# Patient Record
Sex: FEMALE | Race: WHITE | NOT HISPANIC OR LATINO | Employment: OTHER | URBAN - METROPOLITAN AREA
[De-identification: names, ages, dates, MRNs, and addresses within clinical notes are randomized per-mention and may not be internally consistent; named-entity substitution may affect disease eponyms.]

---

## 2017-02-11 DIAGNOSIS — E03.9 ACQUIRED HYPOTHYROIDISM: Primary | ICD-10-CM

## 2017-02-11 NOTE — TELEPHONE ENCOUNTER
HIRA THYROID 60 MG tablet     Last Written Prescription Date: 7/28/16  Last Quantity: 90, # refills: 1  Last Office Visit with G, P or [facility] prescribing provider: 9/19/16        TSH   Date Value Ref Range Status   09/19/2016 3.03 0.40 - 4.00 mU/L Final

## 2017-02-14 RX ORDER — THYROID 60 MG
TABLET ORAL
Qty: 90 TABLET | Refills: 0 | Status: SHIPPED | OUTPATIENT
Start: 2017-02-14 | End: 2017-05-12

## 2017-02-14 NOTE — TELEPHONE ENCOUNTER
Prescription approved per [facility] Refill Protocol.  Echo Lopez RN

## 2017-03-13 DIAGNOSIS — I10 HYPERTENSION GOAL BP (BLOOD PRESSURE) < 150/90: ICD-10-CM

## 2017-03-13 NOTE — TELEPHONE ENCOUNTER
potassium chloride       Last Written Prescription Date: 03/17/16  Last Fill Quantity: 90, # refills: 3    Last Office Visit with [facility], [facility] or [facility] prescribing provider:  09/19/16 Dr. Ordaz     Future Office Visit:        BP Readings from Last 3 Encounters:   09/19/16 149/72   03/17/16 175/76   09/17/15 139/72     JAMIR Mckenzie (TANJA)

## 2017-03-15 RX ORDER — PROPRANOLOL HYDROCHLORIDE 80 MG/1
80 TABLET ORAL 2 TIMES DAILY
Qty: 180 TABLET | Refills: 3 | Status: SHIPPED | OUTPATIENT
Start: 2017-03-15 | End: 2018-04-20

## 2017-03-15 RX ORDER — POTASSIUM CHLORIDE 1500 MG/1
TABLET, EXTENDED RELEASE ORAL
Qty: 90 TABLET | Refills: 3 | Status: SHIPPED | OUTPATIENT
Start: 2017-03-15 | End: 2018-03-06

## 2017-03-15 RX ORDER — HYDROCHLOROTHIAZIDE 25 MG/1
25 TABLET ORAL DAILY
Qty: 90 TABLET | Refills: 1 | Status: SHIPPED | OUTPATIENT
Start: 2017-03-15 | End: 2017-12-08

## 2017-03-15 RX ORDER — AMLODIPINE BESYLATE 5 MG/1
5 TABLET ORAL DAILY
Qty: 90 TABLET | Refills: 3 | Status: SHIPPED | OUTPATIENT
Start: 2017-03-15 | End: 2018-04-25

## 2017-03-15 NOTE — TELEPHONE ENCOUNTER
Routing refill request to provider for review/approval because:  Labs out of range:  Elevated blood pressure readings.  Echo Lopez RN

## 2017-04-17 ENCOUNTER — OFFICE VISIT (OUTPATIENT)
Dept: FAMILY MEDICINE | Facility: CLINIC | Age: 82
End: 2017-04-17
Payer: MEDICARE

## 2017-04-17 VITALS
WEIGHT: 144.6 LBS | TEMPERATURE: 98.1 F | OXYGEN SATURATION: 94 % | HEIGHT: 58 IN | BODY MASS INDEX: 30.35 KG/M2 | HEART RATE: 74 BPM | DIASTOLIC BLOOD PRESSURE: 80 MMHG | SYSTOLIC BLOOD PRESSURE: 170 MMHG

## 2017-04-17 DIAGNOSIS — I10 BENIGN ESSENTIAL HYPERTENSION: Primary | ICD-10-CM

## 2017-04-17 DIAGNOSIS — R07.89 CHEST TIGHTNESS: ICD-10-CM

## 2017-04-17 DIAGNOSIS — E03.9 ACQUIRED HYPOTHYROIDISM: ICD-10-CM

## 2017-04-17 DIAGNOSIS — F03.90 DEMENTIA WITHOUT BEHAVIORAL DISTURBANCE, UNSPECIFIED DEMENTIA TYPE: ICD-10-CM

## 2017-04-17 DIAGNOSIS — R42 DIZZINESS: ICD-10-CM

## 2017-04-17 LAB
ANION GAP SERPL CALCULATED.3IONS-SCNC: 6 MMOL/L (ref 3–14)
BUN SERPL-MCNC: 18 MG/DL (ref 7–30)
CALCIUM SERPL-MCNC: 9.4 MG/DL (ref 8.5–10.1)
CHLORIDE SERPL-SCNC: 97 MMOL/L (ref 94–109)
CO2 SERPL-SCNC: 33 MMOL/L (ref 20–32)
CREAT SERPL-MCNC: 0.65 MG/DL (ref 0.52–1.04)
GFR SERPL CREATININE-BSD FRML MDRD: 86 ML/MIN/1.7M2
GLUCOSE SERPL-MCNC: 96 MG/DL (ref 70–99)
POTASSIUM SERPL-SCNC: 3.6 MMOL/L (ref 3.4–5.3)
SODIUM SERPL-SCNC: 136 MMOL/L (ref 133–144)
T3 SERPL-MCNC: 95 NG/DL (ref 60–181)
TSH SERPL DL<=0.005 MIU/L-ACNC: 2.94 MU/L (ref 0.4–4)
VIT B12 SERPL-MCNC: 851 PG/ML (ref 193–986)

## 2017-04-17 PROCEDURE — 99214 OFFICE O/P EST MOD 30 MIN: CPT | Performed by: FAMILY MEDICINE

## 2017-04-17 PROCEDURE — 82607 VITAMIN B-12: CPT | Performed by: FAMILY MEDICINE

## 2017-04-17 PROCEDURE — 36415 COLL VENOUS BLD VENIPUNCTURE: CPT | Performed by: FAMILY MEDICINE

## 2017-04-17 PROCEDURE — 84480 ASSAY TRIIODOTHYRONINE (T3): CPT | Performed by: FAMILY MEDICINE

## 2017-04-17 PROCEDURE — 84443 ASSAY THYROID STIM HORMONE: CPT | Performed by: FAMILY MEDICINE

## 2017-04-17 PROCEDURE — 80048 BASIC METABOLIC PNL TOTAL CA: CPT | Performed by: FAMILY MEDICINE

## 2017-04-17 ASSESSMENT — PAIN SCALES - GENERAL: PAINLEVEL: NO PAIN (0)

## 2017-04-17 NOTE — MR AVS SNAPSHOT
"              After Visit Summary   4/17/2017    Ania Cortez    MRN: [id number]           Patient Information     Date Of Birth          12/25/1926        Visit Information        Provider Department      4/17/2017 2:00 PM Juliette Ordaz MD [facility]        Today's Diagnoses     Benign essential hypertension    -  1    Acquired hypothyroidism        Dementia without behavioral disturbance, unspecified dementia type        Dizziness        Chest tightness          Care Instructions    If chest tightness persists, go the E.R. However, if it is only occurring intermittently then follow up for further work up.     Follow up with ongoing dizziness or chest concerns.     Follow up in 6 months for check in.         Follow-ups after your visit        Who to contact     If you have questions or need follow up information about today's clinic visit or your schedule please contact [facility] directly at [phone].  Normal or non-critical lab and imaging results will be communicated to you by [employer], letter or phone within 4 business days after the clinic has received the results. If you do not hear from us within 7 days, please contact the clinic through [employer] or phone. If you have a critical or abnormal lab result, we will notify you by phone as soon as possible.  Submit refill requests through [employer] or call your pharmacy and they will forward the refill request to us. Please allow 3 business days for your refill to be completed.          Additional Information About Your Visit        MyChart Information     [employer] lets you send messages to your doctor, view your test results, renew your prescriptions, schedule appointments and more. To sign up, go to [email or web address] . Click on \"Log in\" on the left side of the screen, which will take you to the Welcome page. Then click on \"Sign up Now\" on the right side of the page.     You will be asked to enter the access code " "listed below, as well as some personal information. Please follow the directions to create your username and password.     Your access code is: [email or web address]  Expires: 2017  2:51 PM     Your access code will  in 90 days. If you need help or a new code, please call your [facility] or [phone].        Care EveryWhere ID     This is your Care EveryWhere ID. This could be used by other organizations to access your [facility] medical records  [id number]        Your Vitals Were     Pulse Temperature Height Pulse Oximetry Breastfeeding? BMI (Body Mass Index)    74 98.1  F (36.7  C) (Oral) 1.473 m (4' 10\") 94% No 30.22 kg/m2       Blood Pressure from Last 3 Encounters:   17 166/82   16 149/72   16 175/76    Weight from Last 3 Encounters:   17 65.6 kg (144 lb 9.6 oz)   16 65.5 kg (144 lb 8 oz)   16 62.1 kg (137 lb)              We Performed the Following     Basic metabolic panel     T3, total     TSH with free T4 reflex     Vitamin B12        Primary Care Provider Office Phone # Fax #    Juliette Ordaz [phone]       [facility] [address] [place] [address] N  [facility] [address]        Thank you!     Thank you for choosing [facility]  for your care. Our goal is always to provide you with excellent care. Hearing back from our patients is one way we can continue to improve our services. Please take a few minutes to complete the written survey that you may receive in the mail after your visit with us. Thank you!             Your Updated Medication List - Protect others around you: Learn how to safely use, store and throw away your medicines at www.disposemymeds.org.          This list is accurate as of: 17  2:51 PM.  Always use your most recent med list.                   Brand Name Dispense Instructions for use    acetaminophen 325 MG tablet    TYLENOL    100 tablet    Take 2 tablets (650 mg) by mouth 3 times daily And " Every 4 hours prn       amLODIPine 5 MG tablet    NORVASC    90 tablet    Take 1 tablet (5 mg) by mouth daily       ARMOUR THYROID 60 MG tablet   Generic drug:  thyroid     90 tablet    TAKE 1 TABLET BY MOUTH DAILY       aspirin  MG EC tablet      Take 325 mg by mouth daily.       hydrochlorothiazide 25 MG tablet    HYDRODIURIL    90 tablet    Take 1 tablet (25 mg) by mouth daily       ibuprofen 200 MG tablet    ADVIL    100 tablet    Take 3 tablets (600 mg) by mouth 3 times daily (with meals) scheduled for 5 days and then every 6 hours as needed for pain.       multivitamin, therapeutic with minerals Tabs tablet      Take 1 tablet by mouth daily.       * order for DME     4 Device    Equipment being ordered: Knee high compression stockings 20-30 mmHg, measure to size       * order for DME     1 Device    Equipment being ordered: wheeled walker       potassium chloride SA 20 MEQ CR tablet    K-DUR/KLOR-CON M    90 tablet    TAKE 1 TABLET(20 MEQ) BY MOUTH DAILY       propranolol 80 MG tablet    INDERAL    180 tablet    Take 1 tablet (80 mg) by mouth 2 times daily       * Notice:  This list has 2 medication(s) that are the same as other medications prescribed for you. Read the directions carefully, and ask your doctor or other care provider to review them with you.

## 2017-04-17 NOTE — NURSING NOTE
"Chief Complaint   Patient presents with     Hypertension       Initial /78 (BP Location: Left arm, Patient Position: Chair, Cuff Size: Adult Small)  Pulse 74  Temp 98.1  F (36.7  C) (Oral)  Ht 1.473 m (4' 10\")  Wt 65.6 kg (144 lb 9.6 oz)  SpO2 94%  Breastfeeding? No  BMI 30.22 kg/m2 Estimated body mass index is 30.22 kg/(m^2) as calculated from the following:    Height as of this encounter: 1.473 m (4' 10\").    Weight as of this encounter: 65.6 kg (144 lb 9.6 oz).  Medication Reconciliation: complete     ANDIE Robertson MA      "

## 2017-04-17 NOTE — PATIENT INSTRUCTIONS
If chest tightness persists, go the E.R. However, if it is only occurring intermittently then follow up for further work up.     Follow up with ongoing dizziness or chest concerns.     Follow up in 6 months for check in.

## 2017-04-17 NOTE — LETTER
[address]  [address]  [phone]    April 18, 2017      Ania Cortez  [address]   [facility] [address]              Dear Ania,    It was a pleasure seeing you at your recent visit. Your labs have been reviewed and are attached.     Labs look good with no cause for your dizziness seen. The kidney and electrolyte panel, thyroid tests and vitamin B12 level were all normal.     We could trial a small increase in your blood pressure medications to see if this gives you a bit better blood pressure control which may in turn help reduce the risk of dizziness. Please trial increasing your amlodipine dose to 7.5 mg daily (Take 1.5 tablets of the 5 mg dose).  Let me know how this is working after a few weeks, and I'll send in a new prescription if it is going well.         Sincerely,    Juliette Ordaz M.D.      Results for orders placed or performed in visit on 04/17/17   Basic metabolic panel   Result Value Ref Range    Sodium 136 133 - 144 mmol/L    Potassium 3.6 3.4 - 5.3 mmol/L    Chloride 97 94 - 109 mmol/L    Carbon Dioxide 33 (H) 20 - 32 mmol/L    Anion Gap 6 3 - 14 mmol/L    Glucose 96 70 - 99 mg/dL    Urea Nitrogen 18 7 - 30 mg/dL    Creatinine 0.65 0.52 - 1.04 mg/dL    GFR Estimate 86 >60 mL/min/1.7m2    GFR Estimate If Black >90   GFR Calc   >60 mL/min/1.7m2    Calcium 9.4 8.5 - 10.1 mg/dL   Vitamin B12   Result Value Ref Range    Vitamin B12 851 193 - 986 pg/mL   TSH with free T4 reflex   Result Value Ref Range    TSH 2.94 0.40 - 4.00 mU/L   T3, total   Result Value Ref Range    Triiodothyronine (T3) 95 60 - 181 ng/dL

## 2017-04-17 NOTE — PROGRESS NOTES
SUBJECTIVE:                                                    Ania Cortez is a 89 year old female who presents to clinic today for the following health issues:      Hypertension Follow-up      Outpatient blood pressures checking randomly    Low Salt Diet: not monitoring salt       Amount of exercise or physical activity: None    Problems taking medications regularly: No    Medication side effects: none    Diet: regular (no restrictions)      Problem list and histories reviewed & adjusted, as indicated.  Additional history: as documented    Ania reports that yesterday morning when she got out of bed, she was very dizzy and is unsure why. She states that her balance was not quite normal, but this improved throughout the day. She slept throughout the night, 8 hours which is abnormal for her as she usually wakes up throughout the night. Some residual dizziness this morning but this has resolved now.  Unsure if turning her headed caused dizziness. Denies nausea, vision changes, weakness in arms, legs.     HTN: she has been intermittently checking her bp at home. At home systolic:137-156. Denies trouble with medication and she is taking her medication. Daughter in law reports she is arranging her own medication and her system seems to be going well.     Additional Notes  - she states that her bottom dentures do not fit and she has f/u for readjustment.   -appetite is stable, she is eating 3 meals a day most of the time.   --arthritis is improved and she attributes this to not overdoing activities. Had right shoulder surgery and has limited activities which she believes has helped.   -daughter in law notes some mild memory degeneratio but nothing outside normal and  it notes that it is not concerning.  States she does not talk to many people, and is eating meals in her appointment. Playing bingo in her building.   -has had some mild chest pressure when sitting last night. Lasted 5-10 minutes and then resolved on it  won. Denies trouble breathing, nausea, worsening dizziness. Declines further work up and states she is not concerned as it wasn't that bad but will f/u if persistent.   -allergies are flared, and has had some rhinorrhea and congestion. Denies sinus pain.states she is allergic to something in her apartment, rhinorrhea and congestion have worsened with moving to her assisted living 2 years ago.  States dizziness did not worsen sx's.   - she used to take thyroid medication but as of now she states she is feeling lazy. She is also forgetting to take Aspirin sometimes.   -she is taking OTC multivitamin from [employer]  And states it it has [id number].      Patient Active Problem List   Diagnosis     S/P rotator cuff surgery     Osteoarthritis     Hip pain     Acquired hypothyroidism     Hypertension goal BP (blood pressure) < 150/90     Advanced directives, counseling/discussion     Dementia     Gait instability     Past Surgical History:   Procedure Laterality Date     BREAST SURGERY       CATARACT IOL, RT/LT       CHOLECYSTECTOMY       GYN SURGERY      hystorectomy       Social History   Substance Use Topics     Smoking status: Never Smoker     Smokeless tobacco: Never Used     Alcohol use Yes      Comment: rarely     History reviewed. No pertinent family history.      Current Outpatient Prescriptions   Medication Sig Dispense Refill     potassium chloride SA (K-DUR/KLOR-CON M) 20 MEQ CR tablet TAKE 1 TABLET(20 MEQ) BY MOUTH DAILY 90 tablet 3     amLODIPine (NORVASC) 5 MG tablet Take 1 tablet (5 mg) by mouth daily 90 tablet 3     propranolol (INDERAL) 80 MG tablet Take 1 tablet (80 mg) by mouth 2 times daily 180 tablet 3     hydrochlorothiazide (HYDRODIURIL) 25 MG tablet Take 1 tablet (25 mg) by mouth daily 90 tablet 1     ARMOUR THYROID 60 MG tablet TAKE 1 TABLET BY MOUTH DAILY 90 tablet 0     order for DME Equipment being ordered: Knee high compression stockings 20-30 mmHg, measure to size 4 Device 3     order for DME  "Equipment being ordered: wheeled walker 1 Device 0     acetaminophen (TYLENOL) 325 MG tablet Take 2 tablets (650 mg) by mouth 3 times daily And Every 4 hours prn 100 tablet 0     ibuprofen (ADVIL) 200 MG tablet Take 3 tablets (600 mg) by mouth 3 times daily (with meals) scheduled for 5 days and then every 6 hours as needed for pain. 100 tablet 0     multivitamin, therapeutic with minerals (THERA-VIT-M) TABS Take 1 tablet by mouth daily.       aspirin  MG tablet Take 325 mg by mouth daily.       Allergies   Allergen Reactions     Penicillins      Sulfa Drugs        Reviewed and updated as needed this visit by clinical staff  Tobacco  Allergies  Meds  Med Hx  Surg Hx  Fam Hx  Soc Hx      Reviewed and updated as needed this visit by Provider         ROS:  Constitutional, HEENT, cardiovascular, pulmonary, gi and gu systems are negative, except as otherwise noted.    OBJECTIVE:                                                    /80 (BP Location: Left arm)  Pulse 74  Temp 98.1  F (36.7  C) (Oral)  Ht 1.473 m (4' 10\")  Wt 65.6 kg (144 lb 9.6 oz)  SpO2 94%  Breastfeeding? No  BMI 30.22 kg/m2  Body mass index is 30.22 kg/(m^2).  GENERAL: healthy, alert and no distress  EYES: Eyes grossly normal to inspection, PERRL and conjunctivae and sclerae normal. EOMI  HENT: ear canals and  Cerumen present in rightTM's normal  GENERAL: healthy, alert and no distress  NECK: no carotid bruits  RESP: lungs clear to auscultation - no rales, rhonchi or wheezes  CV: regular rate and rhythm, normal S1 S2, no S3 or S4, no murmur, click or rub, no peripheral edema and peripheral pulses strong  NEURO: Normal strength and tone, sensory exam grossly normal, light touch   normal, mentation intact and cranial nerves 2-12 intact.   PSYCH: mentation appears normal, affect normal/bright    Diagnostic Test Results:  No results found for this or any previous visit (from the past 24 hour(s)).     ASSESSMENT/PLAN:                    "                                   1. Benign essential hypertension  Fair control. Home readings have been good. Continue to monitor and Continue current medications.  - Basic metabolic panel    2. Acquired hypothyroidism Check TSH with labs today.   - TSH with free T4 reflex  - T3, total    3. Dementia without behavioral disturbance, unspecified dementia type  Daughter-in-law reports no acute concerns today. Continue to monitor. Might need to start med set up in future, more intensive cares if worsens.     4. Dizziness  Unclear etiology. No concerning findings with neuro exam. Will check  VitB12 levels with labstoday. If dizziness persists, F/U PRN.   - Vitamin B12  - T3, total    5. Chest tightness  Discussed further w/u for angina. Pt declines further work up for now due to her age and only occurring one time at rest. However, she will f/u if she has any concerns.     See Patient Instructions  Patient Instructions   If chest tightness persists, go the E.R. However, if it is only occurring intermittently then follow up for further work up.     Follow up with ongoing dizziness or chest concerns.     Follow up in 6 months for check in.       This document serves as a record of the services and decisions personally performed and made by Juliette Ordaz MD. It was created on her behalf by Sharlene Najera,a trained medical scribe. The creation of this document is based the provider's statements to the medical scribe.  Sharlene Najera April 17, 2017 2:38 PM     The information in this document, created by a scribe for me, accurately reflects the services I personally performed and the decisions made by me. I have reviewed and approved this document for accuracy.    MD Juliette Chand MD  [facility]

## 2017-04-25 ENCOUNTER — TELEPHONE (OUTPATIENT)
Dept: FAMILY MEDICINE | Facility: CLINIC | Age: 82
End: 2017-04-25

## 2017-04-25 NOTE — TELEPHONE ENCOUNTER
..Reason for Call:   Amlodipine dosage increase by 1/2 a pill    Detailed comments: they were unable to accomplish that with pill cutter, or knife, they broke apart  Pt feels like bp is ok, no further dizzy spells;    Phone Number Patient can be reached at: Other phone number:  [phone]     Best Time: anytime    Can we leave a detailed message on this number? YES    Call taken on 4/25/2017 at 2:55 PM by Bethany Malcolm

## 2017-04-25 NOTE — TELEPHONE ENCOUNTER
"  BP Readings from Last 6 Encounters:   04/17/17 170/80   09/19/16 149/72   03/17/16 175/76   09/17/15 139/72   07/30/15 166/80   04/30/15 142/62     Daughter in law states that pt had no dizzyness episode since last visit. Pt would like to go back to one tablet a day since it was impossible to cut pills in half. Her blood pressure at home is 130-150 systolic and diastolic is 80's. She just refilled her amlodipine 5 mg and paid out of pocket $65.  Just a question: \"is there a 7.5 mg tablet\"?  Nidia Pineda RN    "

## 2017-04-27 NOTE — TELEPHONE ENCOUNTER
Pt updated of the below, no questions at this time, verbalized understanding.  Advised to update the clinic if BP gets high in 150's or 160's.  Nidia Pineda RN

## 2017-04-27 NOTE — TELEPHONE ENCOUNTER
Ok to continue at 5 mg dose given bp has been good. There is no 7.5 mg tablet (However, could take a 5 mg tab + a 2.5 mg tab)

## 2017-05-12 DIAGNOSIS — E03.9 ACQUIRED HYPOTHYROIDISM: ICD-10-CM

## 2017-05-12 NOTE — TELEPHONE ENCOUNTER
HIRA THYROID 60 MG tablet     Last Written Prescription Date: 2/14/16  Last Quantity: 90, # refills: 0  Last Office Visit with G, P or [facility] prescribing provider: 4/17/17        TSH   Date Value Ref Range Status   04/17/2017 2.94 0.40 - 4.00 mU/L Final

## 2017-05-16 DIAGNOSIS — E03.9 ACQUIRED HYPOTHYROIDISM: ICD-10-CM

## 2017-05-16 RX ORDER — THYROID 60 MG
TABLET ORAL
Qty: 90 TABLET | Refills: 0 | OUTPATIENT
Start: 2017-05-16

## 2017-05-16 RX ORDER — THYROID 60 MG
TABLET ORAL
Qty: 90 TABLET | Refills: 1 | Status: SHIPPED | OUTPATIENT
Start: 2017-05-16 | End: 2017-10-12

## 2017-05-17 DIAGNOSIS — E03.9 ACQUIRED HYPOTHYROIDISM: ICD-10-CM

## 2017-05-18 RX ORDER — THYROID 60 MG
TABLET ORAL
Qty: 90 TABLET | Refills: 0 | OUTPATIENT
Start: 2017-05-18

## 2017-05-18 NOTE — TELEPHONE ENCOUNTER
[place] [facility]     Last Written Prescription Date: 05/16/2017  Last Quantity: 90, # refills: 1  Last Office Visit with [facility], [facility] or [facility] prescribing provider: 04/17/2017        TSH   Date Value Ref Range Status   04/17/2017 2.94 0.40 - 4.00 mU/L Final     Medication is being denied due to: RX was sent 05/16/2017 for #90 with 1 refill.  No additional refills are needed at this time.  Echo Lopez RN

## 2017-05-22 ENCOUNTER — TELEPHONE (OUTPATIENT)
Dept: FAMILY MEDICINE | Facility: CLINIC | Age: 82
End: 2017-05-22

## 2017-05-22 RX ORDER — THYROID 60 MG
TABLET ORAL
Qty: 90 TABLET | Refills: 0 | OUTPATIENT
Start: 2017-05-22

## 2017-05-22 NOTE — TELEPHONE ENCOUNTER
Medication sent to wrong pharmacy -     Called wrong pharmacy - had them transfer script to [facility] listed below. (gave verbal order as they state they can't grab it on her profile)    Called pt's daughter in law, informed her of this.    Vicente Lawrence MA

## 2017-05-22 NOTE — TELEPHONE ENCOUNTER
Reason for Call:  Other prescription    Detailed comments: Guillermo (Zahra in law) calling stating that [facility] keeps telling they never received Rx refill for Ania's Thyroid medication and she keeps having to get emergency pills a few at a time not sure how many more times they will hand out the emergency pills.  Please re send  HIRA THYROID 60 MG tablet,  [facility] [address] [place] [address] [address] AT [facility]        Phone Number Patient can be reached at: Other phone number:  Cell [phone] Home [phone]     Best Time: Any    Can we leave a detailed message on this number? YES    Call taken on 5/22/2017 at 9:50 AM by June Palacios

## 2017-10-12 ENCOUNTER — OFFICE VISIT (OUTPATIENT)
Dept: FAMILY MEDICINE | Facility: CLINIC | Age: 82
End: 2017-10-12
Payer: MEDICARE

## 2017-10-12 VITALS
BODY MASS INDEX: 30.99 KG/M2 | WEIGHT: 148.3 LBS | HEART RATE: 77 BPM | SYSTOLIC BLOOD PRESSURE: 148 MMHG | DIASTOLIC BLOOD PRESSURE: 68 MMHG | OXYGEN SATURATION: 96 % | TEMPERATURE: 98 F

## 2017-10-12 DIAGNOSIS — E03.9 ACQUIRED HYPOTHYROIDISM: ICD-10-CM

## 2017-10-12 DIAGNOSIS — I10 BENIGN ESSENTIAL HYPERTENSION: Primary | ICD-10-CM

## 2017-10-12 DIAGNOSIS — R53.83 FATIGUE, UNSPECIFIED TYPE: ICD-10-CM

## 2017-10-12 DIAGNOSIS — F03.90 DEMENTIA WITHOUT BEHAVIORAL DISTURBANCE, UNSPECIFIED DEMENTIA TYPE: ICD-10-CM

## 2017-10-12 LAB
ANION GAP SERPL CALCULATED.3IONS-SCNC: 9 MMOL/L (ref 3–14)
BUN SERPL-MCNC: 17 MG/DL (ref 7–30)
CALCIUM SERPL-MCNC: 9.6 MG/DL (ref 8.5–10.1)
CHLORIDE SERPL-SCNC: 97 MMOL/L (ref 94–109)
CO2 SERPL-SCNC: 31 MMOL/L (ref 20–32)
CREAT SERPL-MCNC: 0.64 MG/DL (ref 0.52–1.04)
ERYTHROCYTE [DISTWIDTH] IN BLOOD BY AUTOMATED COUNT: 13.4 % (ref 10–15)
GFR SERPL CREATININE-BSD FRML MDRD: 87 ML/MIN/1.7M2
GLUCOSE SERPL-MCNC: 107 MG/DL (ref 70–99)
HCT VFR BLD AUTO: 41.9 % (ref 35–47)
HGB BLD-MCNC: 13.8 G/DL (ref 11.7–15.7)
MCH RBC QN AUTO: 28.8 PG (ref 26.5–33)
MCHC RBC AUTO-ENTMCNC: 32.9 G/DL (ref 31.5–36.5)
MCV RBC AUTO: 88 FL (ref 78–100)
PLATELET # BLD AUTO: 343 10E9/L (ref 150–450)
POTASSIUM SERPL-SCNC: 3.8 MMOL/L (ref 3.4–5.3)
RBC # BLD AUTO: 4.79 10E12/L (ref 3.8–5.2)
SODIUM SERPL-SCNC: 137 MMOL/L (ref 133–144)
T3 SERPL-MCNC: 116 NG/DL (ref 60–181)
TSH SERPL DL<=0.005 MIU/L-ACNC: 3.55 MU/L (ref 0.4–4)
WBC # BLD AUTO: 10.6 10E9/L (ref 4–11)

## 2017-10-12 PROCEDURE — 80048 BASIC METABOLIC PNL TOTAL CA: CPT | Performed by: FAMILY MEDICINE

## 2017-10-12 PROCEDURE — 84443 ASSAY THYROID STIM HORMONE: CPT | Performed by: FAMILY MEDICINE

## 2017-10-12 PROCEDURE — 99214 OFFICE O/P EST MOD 30 MIN: CPT | Performed by: FAMILY MEDICINE

## 2017-10-12 PROCEDURE — 85027 COMPLETE CBC AUTOMATED: CPT | Performed by: FAMILY MEDICINE

## 2017-10-12 PROCEDURE — 36415 COLL VENOUS BLD VENIPUNCTURE: CPT | Performed by: FAMILY MEDICINE

## 2017-10-12 PROCEDURE — 84480 ASSAY TRIIODOTHYRONINE (T3): CPT | Performed by: FAMILY MEDICINE

## 2017-10-12 RX ORDER — THYROID 60 MG/1
60 TABLET ORAL DAILY
Qty: 90 TABLET | Refills: 1 | Status: SHIPPED | OUTPATIENT
Start: 2017-10-12 | End: 2017-10-13

## 2017-10-12 NOTE — PATIENT INSTRUCTIONS
Try to exercise daily.     If you are eating 3 meals a day, you do not need to drink the boost.    Follow up in 6 months

## 2017-10-12 NOTE — PROGRESS NOTES
"  SUBJECTIVE:   Ania Cortez is a 89 year old female who presents to clinic today for the following health issues:      Hypertension Follow-up      Outpatient blood pressures are being checked at home.  Results are /70's.    Low Salt Diet: not monitoring salt    Amount of exercise or physical activity: None    Problems taking medications regularly: No    Medication side effects: none    Diet: regular (no restrictions)    Pt is accompanied by daughter in law    She has noticed she is more fatigued than usual. . She is wondering if thyroid meds are causing fatigue. Usually falling asleep around 11am and waking around 7-8 am. She is lying down to watch 2 shows around 10 am and 3 pm she might fall asleep during. She is feeling lazy- having a hard time getting things done. Fatigue has been going on for quite some time (since last April). Denies depression  TSH   Date Value Ref Range Status   04/17/2017 2.94 0.40 - 4.00 mU/L Final       Weight/Exercise: She doesn't go downstairs in her building to eat meals because of spices that are added to meals. She is eating frozen meals usually. Appetite is normal- but \"not as hungry as before.\" Pt says she is not exercising enough. She enjoys dancing and would like to exercise that way. Pt has been drinking boost on top of eating her meals- dentist recommended drinking boost because she had lower teeth removed and couldn't chew for a while.  Wt Readings from Last 5 Encounters:   10/12/17 67.3 kg (148 lb 4.8 oz)   04/17/17 65.6 kg (144 lb 9.6 oz)   09/19/16 65.5 kg (144 lb 8 oz)   03/17/16 62.1 kg (137 lb)   09/17/15 62.7 kg (138 lb 3.2 oz)     GI: bm's at her baseline. occ constipation.   Denies: heartburn, nausea, abdominal pain, trouble breathing, cp,     -She is not using a cane or walker. Last fall was 2 years ago.    Mood: Pt gets frustrated when she isn't getting up and getting things done, but overall thinks it is normal.    -Thinks memory is getting slightly worse. " Associates this with thyroid med. Patient's daughter in law who is with her has no concerns with current housing arrangement.   -Has meds lined up on one side of the counter and when she takes meds she moves her pills to the other side, when she takes afternoon medication she will move pills back to the other side of the counter. Daughter in law and daughter think she is taking her medication most days. System works well      BP: no longer having dizziness. When checking BP at home it is usually normal.   BP Readings from Last 3 Encounters:   10/12/17 148/68   04/17/17 170/80   09/19/16 149/72       -Pt said quite a while ago she received a vaccine (unsure if flu or not). She had swelling and redness where the shot was given, fever, cold like sx's. This is making her hesitant to receive flu shot today. She also says she doesn't interact with people often so she is not worried about getting the flu.        Problem list and histories reviewed & adjusted, as indicated.  Additional history: as documented    Patient Active Problem List   Diagnosis     S/P rotator cuff surgery     Osteoarthritis     Hip pain     Acquired hypothyroidism     Benign essential hypertension     Advanced directives, counseling/discussion     Dementia     Gait instability     Past Surgical History:   Procedure Laterality Date     BREAST SURGERY       CATARACT IOL, RT/LT       CHOLECYSTECTOMY       GYN SURGERY      hystorectomy       Social History   Substance Use Topics     Smoking status: Never Smoker     Smokeless tobacco: Never Used     Alcohol use Yes      Comment: rarely     History reviewed. No pertinent family history.      Current Outpatient Prescriptions   Medication Sig Dispense Refill     ARMOUR THYROID 60 MG tablet TAKE 1 TABLET BY MOUTH DAILY 90 tablet 1     potassium chloride SA (K-DUR/KLOR-CON M) 20 MEQ CR tablet TAKE 1 TABLET(20 MEQ) BY MOUTH DAILY 90 tablet 3     amLODIPine (NORVASC) 5 MG tablet Take 1 tablet (5 mg) by mouth  daily 90 tablet 3     propranolol (INDERAL) 80 MG tablet Take 1 tablet (80 mg) by mouth 2 times daily 180 tablet 3     hydrochlorothiazide (HYDRODIURIL) 25 MG tablet Take 1 tablet (25 mg) by mouth daily 90 tablet 1     multivitamin, therapeutic with minerals (THERA-VIT-M) TABS Take 1 tablet by mouth daily.       aspirin  MG tablet Take 325 mg by mouth daily.       order for DME Equipment being ordered: Knee high compression stockings 20-30 mmHg, measure to size (Patient not taking: Reported on 10/12/2017) 4 Device 3     order for DME Equipment being ordered: wheeled walker (Patient not taking: Reported on 10/12/2017) 1 Device 0     acetaminophen (TYLENOL) 325 MG tablet Take 2 tablets (650 mg) by mouth 3 times daily And Every 4 hours prn (Patient not taking: Reported on 10/12/2017) 100 tablet 0     ibuprofen (ADVIL) 200 MG tablet Take 3 tablets (600 mg) by mouth 3 times daily (with meals) scheduled for 5 days and then every 6 hours as needed for pain. (Patient not taking: Reported on 10/12/2017) 100 tablet 0     Allergies   Allergen Reactions     Penicillins      Sulfa Drugs          Reviewed and updated as needed this visit by clinical staff     Reviewed and updated as needed this visit by Provider         ROS:  Constitutional, HEENT, cardiovascular, pulmonary, gi and gu systems are negative, except as otherwise noted.    This document serves as a record of the services and decisions personally performed and made by Juliette Ordaz MD. It was created on her behalf by Sandra Zuluaga, a trained medical scribe. The creation of this document is based the provider's statements to the medical scribe.  Sandra Zuluaga October 12, 2017 1:19 PM      OBJECTIVE:   /68  Pulse 77  Temp 98  F (36.7  C) (Oral)  Wt 67.3 kg (148 lb 4.8 oz)  SpO2 96%  BMI 30.99 kg/m2  Body mass index is 30.99 kg/(m^2).  GENERAL: healthy, alert and no distress, obese  NECK: no adenopathy, no asymmetry, masses, or scars and  thyroid normal to palpation  RESP: lungs clear to auscultation - no rales, rhonchi or wheezes  CV: regular rate and rhythm, normal S1 S2, no S3 or S4, no murmur, click or rub, no- pitting mild peripheral edema R>L- unchanged, and peripheral pulses strong  SKIN: no suspicious lesions or rashes to visible skin  NEURO:  mentation at baseline and speech normal  PSYCH: mentation appears normal, affect normal/bright    Diagnostic Test Results:  Reviewed labs from last visit.     ASSESSMENT/PLAN:     1. Benign essential hypertension  fair control. Home readings have been good. Continue to monitor and continue same medication.   - Basic metabolic panel    2. Acquired hypothyroidism  Pt still feeling fatigued and would like tsh checked today  - TSH with free T4 reflex  - T3, total  - thyroid (ARMOUR THYROID) 60 MG tablet; Take 1 tablet (60 mg) by mouth daily  Dispense: 90 tablet; Refill: 1    3. Dementia without behavioral disturbance, unspecified dementia type  daughter in law has no acute concerns today. Continue to monitor. Will consider more intensive care if worsens.  - CBC with platelets    4. Fatigue, unspecified type  as above #2.      Med check Q 6 mo    Patient Instructions   Try to exercise daily.     If you are eating 3 meals a day, you do not need to drink the boost.      The information in this document, created by the medical scribe for me, accurately reflects the services I personally performed and the decisions made by me. I have reviewed and approved this document for accuracy.   MD Juliette Lino MD  [facility]

## 2017-10-12 NOTE — NURSING NOTE
"Chief Complaint   Patient presents with     Hypertension       Initial /74  Pulse 77  Temp 98  F (36.7  C) (Oral)  Wt 67.3 kg (148 lb 4.8 oz)  SpO2 96%  BMI 30.99 kg/m2 Estimated body mass index is 30.99 kg/(m^2) as calculated from the following:    Height as of 4/17/17: 1.473 m (4' 10\").    Weight as of this encounter: 67.3 kg (148 lb 4.8 oz).  Medication Reconciliation: complete   Cristy Dos Santos CMA      "

## 2017-10-12 NOTE — MR AVS SNAPSHOT
"              After Visit Summary   10/12/2017    Ania Cortez    MRN: [id number]           Patient Information     Date Of Birth          12/25/1926        Visit Information        Provider Department      10/12/2017 1:00 PM Juliette Ordaz MD [facility]        Today's Diagnoses     Benign essential hypertension    -  1    Acquired hypothyroidism        Dementia without behavioral disturbance, unspecified dementia type        Fatigue, unspecified type          Care Instructions    Try to exercise daily.     If you are eating 3 meals a day, you do not need to drink the boost.    Follow up in 6 months          Follow-ups after your visit        Who to contact     If you have questions or need follow up information about today's clinic visit or your schedule please contact [facility] directly at [phone].  Normal or non-critical lab and imaging results will be communicated to you by MyChart, letter or phone within 4 business days after the clinic has received the results. If you do not hear from us within 7 days, please contact the clinic through MyChart or phone. If you have a critical or abnormal lab result, we will notify you by phone as soon as possible.  Submit refill requests through [employer] or call your pharmacy and they will forward the refill request to us. Please allow 3 business days for your refill to be completed.          Additional Information About Your Visit        [employer] Information     [employer] lets you send messages to your doctor, view your test results, renew your prescriptions, schedule appointments and more. To sign up, go to [email or web address] . Click on \"Log in\" on the left side of the screen, which will take you to the Welcome page. Then click on \"Sign up Now\" on the right side of the page.     You will be asked to enter the access code listed below, as well as some personal information. Please follow the directions to create your " username and password.     Your access code is: [email or web address]  Expires: 1/10/2018  1:42 PM     Your access code will  in 90 days. If you need help or a new code, please call your [facility] or [phone].        Care EveryWhere ID     This is your Care EveryWhere ID. This could be used by other organizations to access your [facility] medical records  [id number]        Your Vitals Were     Pulse Temperature Pulse Oximetry BMI (Body Mass Index)          77 98  F (36.7  C) (Oral) 96% 30.99 kg/m2         Blood Pressure from Last 3 Encounters:   10/12/17 148/68   17 170/80   16 149/72    Weight from Last 3 Encounters:   10/12/17 67.3 kg (148 lb 4.8 oz)   17 65.6 kg (144 lb 9.6 oz)   16 65.5 kg (144 lb 8 oz)              We Performed the Following     Basic metabolic panel     CBC with platelets     T3, total     TSH with free T4 reflex          Today's Medication Changes          These changes are accurate as of: 10/12/17  1:42 PM.  If you have any questions, ask your nurse or doctor.               These medicines have changed or have updated prescriptions.        Dose/Directions    thyroid 60 MG tablet   Commonly known as:  ARMOUR THYROID   This may have changed:  See the new instructions.   Used for:  Acquired hypothyroidism   Changed by:  Juliette Ordaz MD        Dose:  60 mg   Take 1 tablet (60 mg) by mouth daily   Quantity:  90 tablet   Refills:  1            Where to get your medicines      These medications were sent to [facility] [address] [place] [address] [address] AT [facility] & [place] [address], [facility] [address]     Phone:  [phone]     thyroid 60 MG tablet                Primary Care Provider Office Phone # Fax #    Juliette Ordaz [phone] [address]  [facility] [address]        Equal Access to Services     DELMA PARNELL AH: fito Mcallister,  josephine braun hungderek chavez anithain hayaan adeeg kharash la'aan ah. So [facility] [phone].    ATENCIÓN: Si niraj pina, tiene a swann disposición servicios gratuitos de asistencia lingüística. Anabela al [phone].    We comply with applicable federal civil rights laws and [place] laws. We do not discriminate on the basis of race, color, national origin, age, disability, sex, sexual orientation, or gender identity.            Thank you!     Thank you for choosing [facility]  for your care. Our goal is always to provide you with excellent care. Hearing back from our patients is one way we can continue to improve our services. Please take a few minutes to complete the written survey that you may receive in the mail after your visit with us. Thank you!             Your Updated Medication List - Protect others around you: Learn how to safely use, store and throw away your medicines at www.disposemymeds.org.          This list is accurate as of: 10/12/17  1:42 PM.  Always use your most recent med list.                   Brand Name Dispense Instructions for use Diagnosis    acetaminophen 325 MG tablet    TYLENOL    100 tablet    Take 2 tablets (650 mg) by mouth 3 times daily And Every 4 hours prn    Hip pain, left       amLODIPine 5 MG tablet    NORVASC    90 tablet    Take 1 tablet (5 mg) by mouth daily    Hypertension goal BP (blood pressure) < 150/90       aspirin  MG EC tablet      Take 325 mg by mouth daily.        hydrochlorothiazide 25 MG tablet    HYDRODIURIL    90 tablet    Take 1 tablet (25 mg) by mouth daily    Hypertension goal BP (blood pressure) < 150/90       ibuprofen 200 MG tablet    ADVIL    100 tablet    Take 3 tablets (600 mg) by mouth 3 times daily (with meals) scheduled for 5 days and then every 6 hours as needed for pain.    Hip pain, left, Muscle strain of gluteal region, left, initial encounter       multivitamin, therapeutic with minerals Tabs tablet      Take 1 tablet by  mouth daily.        * order for DME     4 Device    Equipment being ordered: Knee high compression stockings 20-30 mmHg, measure to size    Bilateral leg edema       * order for DME     1 Device    Equipment being ordered: wheeled walker    Gait instability       potassium chloride SA 20 MEQ CR tablet    K-DUR/KLOR-CON M    90 tablet    TAKE 1 TABLET(20 MEQ) BY MOUTH DAILY    Hypertension goal BP (blood pressure) < 150/90       propranolol 80 MG tablet    INDERAL    180 tablet    Take 1 tablet (80 mg) by mouth 2 times daily    Hypertension goal BP (blood pressure) < 150/90       thyroid 60 MG tablet    ARMOUR THYROID    90 tablet    Take 1 tablet (60 mg) by mouth daily    Acquired hypothyroidism       * Notice:  This list has 2 medication(s) that are the same as other medications prescribed for you. Read the directions carefully, and ask your doctor or other care provider to review them with you.

## 2017-10-13 ENCOUNTER — TELEPHONE (OUTPATIENT)
Dept: FAMILY MEDICINE | Facility: CLINIC | Age: 82
End: 2017-10-13

## 2017-10-13 RX ORDER — THYROID 60 MG/1
60 TABLET ORAL DAILY
Qty: 102 TABLET | Refills: 0 | Status: SHIPPED | OUTPATIENT
Start: 2017-10-13 | End: 2018-01-10

## 2017-10-13 NOTE — TELEPHONE ENCOUNTER
Patient returned call    Best number to reach them: Other phone number:  [facility] [phone]    Is it ok to leave a detailed message: YES

## 2017-10-13 NOTE — TELEPHONE ENCOUNTER
Notes Recorded by Juliette Ordaz MD on 10/13/2017 at 2:36 PM  Please update patient's family members on results (see demographics)  - thyroid test is upper range, so would like to adjust her thyroid medication dose a bit to see if this helps her fatigue.   I would like her to take 1.5 tablets two days a week and continue on one tablet rest of week. New rx to be sent to pharmacy.   Plan for repeat tsh level in 2-3 months, lab only visit.   - other labs all looked good- blood count, kidney test, blood sugar.     This writer attempted to contact Ania on 10/13/17      Reason for call results and unable to leave message. Spoke to son but he would like this given to his wife who is a nurse. She will call Monday      If patient calls back:   Please Reji STARR.        Cheri Marks RN

## 2017-10-13 NOTE — TELEPHONE ENCOUNTER
Called to gilmar in law Guillermo and voiced understanding of below and will call back to schedule labs.  Cheri Marks RN.

## 2017-12-08 DIAGNOSIS — I10 HYPERTENSION GOAL BP (BLOOD PRESSURE) < 150/90: ICD-10-CM

## 2017-12-08 RX ORDER — HYDROCHLOROTHIAZIDE 25 MG/1
TABLET ORAL
Qty: 90 TABLET | Refills: 0 | Status: SHIPPED | OUTPATIENT
Start: 2017-12-08 | End: 2018-03-06

## 2018-01-09 DIAGNOSIS — E03.9 ACQUIRED HYPOTHYROIDISM: ICD-10-CM

## 2018-01-09 LAB
T3 SERPL-MCNC: 137 NG/DL (ref 60–181)
TSH SERPL DL<=0.005 MIU/L-ACNC: 1.74 MU/L (ref 0.4–4)

## 2018-01-09 PROCEDURE — 84443 ASSAY THYROID STIM HORMONE: CPT | Performed by: FAMILY MEDICINE

## 2018-01-09 PROCEDURE — 84480 ASSAY TRIIODOTHYRONINE (T3): CPT | Performed by: FAMILY MEDICINE

## 2018-01-09 PROCEDURE — 36415 COLL VENOUS BLD VENIPUNCTURE: CPT | Performed by: FAMILY MEDICINE

## 2018-01-09 NOTE — LETTER
January 10, 2018      Ania Cortez  [address]  [place] [address]        Dear Ania,     Enclosed are your recent lab results.     Your recent thyroid labs are improved and normal. Please continue with the current dosing of your thyroid meds. Let me know if you have questions.       Sincerely,        Juliette Ordaz MD      Results for orders placed or performed in visit on 01/09/18   TSH with free T4 reflex   Result Value Ref Range    TSH 1.74 0.40 - 4.00 mU/L   T3, total   Result Value Ref Range    Triiodothyronine (T3) 137 60 - 181 ng/dL

## 2018-01-10 RX ORDER — THYROID 60 MG/1
60 TABLET ORAL DAILY
Qty: 102 TABLET | Refills: 1 | Status: SHIPPED | OUTPATIENT
Start: 2018-01-10 | End: 2018-04-25

## 2018-03-06 DIAGNOSIS — I10 HYPERTENSION GOAL BP (BLOOD PRESSURE) < 150/90: ICD-10-CM

## 2018-03-06 NOTE — TELEPHONE ENCOUNTER
"Requested Prescriptions   Pending Prescriptions Disp Refills     hydrochlorothiazide (HYDRODIURIL) 25 MG tablet [Pharmacy Med Name: HYDROCHLOROTHIAZIDE 25MG TABLETS]  Last Written Prescription Date:  12/08/17  Last Fill Quantity: 90 tablet,  # refills: 0   Last office visit: 10/12/2017 with prescribing provider:  Dr. Ordaz   Future Office Visit:   90 tablet 0     Sig: TAKE 1 TABLET(25 MG) BY MOUTH DAILY    Diuretics (Including Combos) Protocol Failed    3/6/2018 11:52 AM       Failed - Blood pressure under 140/90 in past 12 months    BP Readings from Last 3 Encounters:   10/12/17 148/68   04/17/17 170/80   09/19/16 149/72                Passed - Recent (12 mo) or future (30 days) visit within the authorizing provider's specialty    Patient had office visit in the last year or has a visit in the next 30 days with authorizing provider.  See \"Patient Info\" tab in inbasket, or \"Choose Columns\" in Meds & Orders section of the refill encounter.            Passed - Patient is age 18 or older       Passed - No active pregancy on record       Passed - Normal serum creatinine on file in past 12 months    Recent Labs   Lab Test  10/12/17   1341   CR  0.64             Passed - Normal serum potassium on file in past 12 months    Recent Labs   Lab Test  10/12/17   1341   POTASSIUM  3.8                   Passed - Normal serum sodium on file in past 12 months    Recent Labs   Lab Test  10/12/17   1341   NA  137             Passed - No positive pregnancy test in past 12 months                  potassium chloride SA (K-DUR/KLOR-CON M) 20 MEQ CR tablet [Pharmacy Med Name: POTASSIUM CL 20MEQ ER TABLETS]  Last Written Prescription Date:  3/15/17  Last Fill Quantity: 90 tablet,  # refills: 3   Last office visit: 10/12/2017 with prescribing provider:  Dr. Ordaz   Future Office Visit:   90 tablet 0     Sig: TAKE 1 TABLET(20 MEQ) BY MOUTH DAILY    Potassium Supplements Protocol Passed    3/6/2018 11:52 AM       Passed - " "Recent (12 mo) or future (30 days) visit within the authorizing provider's specialty    Patient had office visit in the last year or has a visit in the next 30 days with authorizing provider.  See \"Patient Info\" tab in inbasket, or \"Choose Columns\" in Meds & Orders section of the refill encounter.            Passed - Patient is age 18 or older       Passed - Normal serum potassium in past 12 months    Recent Labs   Lab Test  10/12/17   1341   POTASSIUM  3.8                      "

## 2018-03-07 RX ORDER — HYDROCHLOROTHIAZIDE 25 MG/1
TABLET ORAL
Qty: 90 TABLET | Refills: 0 | Status: SHIPPED | OUTPATIENT
Start: 2018-03-07 | End: 2018-04-25

## 2018-03-07 RX ORDER — POTASSIUM CHLORIDE 1500 MG/1
TABLET, EXTENDED RELEASE ORAL
Qty: 90 TABLET | Refills: 0 | Status: SHIPPED | OUTPATIENT
Start: 2018-03-07 | End: 2018-04-25

## 2018-03-07 NOTE — TELEPHONE ENCOUNTER
Routing refill request to provider for review/approval because:  BP is out of range for Hydrochlorothiazide    Prescription approved per [facility] Refill Protocol for Potassium.    Sulema Penaloza RN, BSN

## 2018-04-20 DIAGNOSIS — I10 HYPERTENSION GOAL BP (BLOOD PRESSURE) < 150/90: ICD-10-CM

## 2018-04-20 NOTE — TELEPHONE ENCOUNTER
"Requested Prescriptions   Pending Prescriptions Disp Refills     propranolol (INDERAL) 80 MG tablet [Pharmacy Med Name: PROPRANOLOL 80MG TABLETS]  Last Written Prescription Date:  3/15/17  Last Fill Quantity: 180 tablet,  # refills: 3   Last office visit: 10/12/2017 with prescribing provider:  Dr. Ordaz   Future Office Visit:   Next 5 appointments (look out 90 days)     Apr 25, 2018 12:40 PM CDT   Office Visit with Juliette Ordaz MD   [facility] ([address] [address]   [phone]               180 tablet 0     Sig: TAKE 1 TABLET(80 MG) BY MOUTH TWICE DAILY    Beta-Blockers Protocol Failed    4/20/2018  3:22 PM       Failed - Blood pressure under 140/90 in past 12 months    BP Readings from Last 3 Encounters:   10/12/17 148/68   04/17/17 170/80   09/19/16 149/72                Passed - Patient is age 6 or older       Passed - Recent (12 mo) or future (30 days) visit within the authorizing provider's specialty    Patient had office visit in the last 12 months or has a visit in the next 30 days with authorizing provider or within the authorizing provider's specialty.  See \"Patient Info\" tab in inbasket, or \"Choose Columns\" in Meds & Orders section of the refill encounter.              "

## 2018-04-24 RX ORDER — PROPRANOLOL HYDROCHLORIDE 80 MG/1
TABLET ORAL
Qty: 180 TABLET | Refills: 0 | Status: SHIPPED | OUTPATIENT
Start: 2018-04-24 | End: 2018-04-25

## 2018-04-24 NOTE — TELEPHONE ENCOUNTER
Patient due for appointment. Patient has upcoming appointment tomorrow.   Provider to address at appointment.   Betsy Gordon RN

## 2018-04-25 ENCOUNTER — OFFICE VISIT (OUTPATIENT)
Dept: FAMILY MEDICINE | Facility: CLINIC | Age: 83
End: 2018-04-25
Payer: MEDICARE

## 2018-04-25 VITALS
BODY MASS INDEX: 30.86 KG/M2 | WEIGHT: 147 LBS | DIASTOLIC BLOOD PRESSURE: 72 MMHG | SYSTOLIC BLOOD PRESSURE: 136 MMHG | TEMPERATURE: 98 F | HEART RATE: 70 BPM | OXYGEN SATURATION: 96 % | HEIGHT: 58 IN | RESPIRATION RATE: 18 BRPM

## 2018-04-25 DIAGNOSIS — Z23 NEED FOR PNEUMOCOCCAL VACCINE: ICD-10-CM

## 2018-04-25 DIAGNOSIS — R60.0 BILATERAL LEG EDEMA: ICD-10-CM

## 2018-04-25 DIAGNOSIS — E03.9 ACQUIRED HYPOTHYROIDISM: ICD-10-CM

## 2018-04-25 DIAGNOSIS — R26.81 GAIT INSTABILITY: ICD-10-CM

## 2018-04-25 DIAGNOSIS — I10 HYPERTENSION GOAL BP (BLOOD PRESSURE) < 150/90: Primary | ICD-10-CM

## 2018-04-25 DIAGNOSIS — F03.90 DEMENTIA WITHOUT BEHAVIORAL DISTURBANCE, UNSPECIFIED DEMENTIA TYPE: ICD-10-CM

## 2018-04-25 LAB
ANION GAP SERPL CALCULATED.3IONS-SCNC: 5 MMOL/L (ref 3–14)
BUN SERPL-MCNC: 17 MG/DL (ref 7–30)
CALCIUM SERPL-MCNC: 9.6 MG/DL (ref 8.5–10.1)
CHLORIDE SERPL-SCNC: 101 MMOL/L (ref 94–109)
CO2 SERPL-SCNC: 33 MMOL/L (ref 20–32)
CREAT SERPL-MCNC: 0.74 MG/DL (ref 0.52–1.04)
GFR SERPL CREATININE-BSD FRML MDRD: 74 ML/MIN/1.7M2
GLUCOSE SERPL-MCNC: 84 MG/DL (ref 70–99)
POTASSIUM SERPL-SCNC: 4 MMOL/L (ref 3.4–5.3)
SODIUM SERPL-SCNC: 139 MMOL/L (ref 133–144)

## 2018-04-25 PROCEDURE — 90732 PPSV23 VACC 2 YRS+ SUBQ/IM: CPT | Performed by: FAMILY MEDICINE

## 2018-04-25 PROCEDURE — G0009 ADMIN PNEUMOCOCCAL VACCINE: HCPCS | Performed by: FAMILY MEDICINE

## 2018-04-25 PROCEDURE — 99214 OFFICE O/P EST MOD 30 MIN: CPT | Mod: 25 | Performed by: FAMILY MEDICINE

## 2018-04-25 PROCEDURE — 36415 COLL VENOUS BLD VENIPUNCTURE: CPT | Performed by: FAMILY MEDICINE

## 2018-04-25 PROCEDURE — 80048 BASIC METABOLIC PNL TOTAL CA: CPT | Performed by: FAMILY MEDICINE

## 2018-04-25 RX ORDER — AMLODIPINE BESYLATE 5 MG/1
5 TABLET ORAL DAILY
Qty: 90 TABLET | Refills: 3 | Status: SHIPPED | OUTPATIENT
Start: 2018-04-25 | End: 2019-05-22

## 2018-04-25 RX ORDER — POTASSIUM CHLORIDE 1500 MG/1
TABLET, EXTENDED RELEASE ORAL
Qty: 90 TABLET | Refills: 3 | Status: SHIPPED | OUTPATIENT
Start: 2018-04-25 | End: 2019-05-22

## 2018-04-25 RX ORDER — THYROID 60 MG/1
60 TABLET ORAL DAILY
Qty: 102 TABLET | Refills: 3 | Status: SHIPPED | OUTPATIENT
Start: 2018-04-25 | End: 2019-05-22

## 2018-04-25 RX ORDER — PROPRANOLOL HYDROCHLORIDE 80 MG/1
TABLET ORAL
Qty: 180 TABLET | Refills: 3 | Status: SHIPPED | OUTPATIENT
Start: 2018-04-25 | End: 2019-04-26

## 2018-04-25 RX ORDER — HYDROCHLOROTHIAZIDE 25 MG/1
TABLET ORAL
Qty: 90 TABLET | Refills: 3 | Status: SHIPPED | OUTPATIENT
Start: 2018-04-25 | End: 2019-05-22

## 2018-04-25 ASSESSMENT — PAIN SCALES - GENERAL: PAINLEVEL: NO PAIN (0)

## 2018-04-25 NOTE — MR AVS SNAPSHOT
After Visit Summary   4/25/2018    Ania Cortez    MRN: [id number]           Patient Information     Date Of Birth          12/25/1926        Visit Information        Provider Department      4/25/2018 12:40 PM Juliette Ordaz MD [facility]        Today's Diagnoses     Hypertension goal BP (blood pressure) < 150/90    -  1    Acquired hypothyroidism        Bilateral leg edema        Dementia without behavioral disturbance, unspecified dementia type        Gait instability        Need for pneumococcal vaccine          Care Instructions    Call your insurance to discuss coverage of Shingrix (shingles vaccine). Return for nurse only visit or to a pharmacy to receive the vaccine.        At [facility], we strive to deliver an exceptional experience to you, every time we see you.  If you receive a survey in the mail, please send us back your thoughts. We really do value your feedback.    Suggested websites for health information:  WwwAvvenu : Up to date and easily searchable information on multiple topics.  Www.[place].gov : medication info, interactive tutorials, watch real surgeries online  Www.familydoctor.org : good info from the Academy of Family Physicians  Www.cdc.gov : public health info, travel advisories, epidemics (H1N1)  Www.aap.org : children's health info, normal development, vaccinations  Www.health.[facility].[place].us : [place] dept of health, public health issues in [place], N1N1    Your care team:     Family Medicine   RAS Cardoza MD Emily Bunt, LUCY DURAN   S. MD Antonia Steve MD Angela Wermerskirchen, MD         Clinic hours: Monday - Wednesday 7 am-7 pm   Thursdays and Fridays 7 am-5 pm.     [facility]: Monday - Friday 11 am-9 pm,   Saturday and Sunday 9 am-5 pm.    [facility]: Monday -Thursday 8 am-8 pm; Friday 8 am-6 pm; Saturday and Sunday 9 am-5 pm.     [facility]: Monday - Thursday 8 am - 7 pm; Friday 8 am - 6 pm    Clinic: [phone]   [facility]: [phone]   [facility]: [phone]                  Follow-ups after your visit        Who to contact     If you have questions or need follow up information about today's clinic visit or your schedule please contact [facility] directly at [phone].  Normal or non-critical lab and imaging results will be communicated to you by MyChart, letter or phone within 4 business days after the clinic has received the results. If you do not hear from us within 7 days, please contact the clinic through tweetTVhart or phone. If you have a critical or abnormal lab result, we will notify you by phone as soon as possible.  Submit refill requests through [employer] or call your pharmacy and they will forward the refill request to us. Please allow 3 business days for your refill to be completed.          Additional Information About Your Visit        tweetTVharMemetales Information     [employer] lets you send messages to your doctor, view your test results, renew your prescriptions, schedule appointments and more. To sign up, go to [email or web address] . Click on \"Log in\" on the left side of the screen, which will take you to the Welcome page. Then click on \"Sign up Now\" on the right side of the page.     You will be asked to enter the access code listed below, as well as some personal information. Please follow the directions to create your username and password.     Your access code is: [email or web address]  Expires: 2018  1:15 PM     Your access code will  in 90 days. If you need help or a new code, please call your [facility] clinic or [phone].        Care EveryWhere ID     This is your Care EveryWhere ID. This could be used by other organizations to access your [facility] medical records  [id number]        Your Vitals Were     Pulse Temperature Respirations Height " "Pulse Oximetry Breastfeeding?    70 98  F (36.7  C) (Oral) 18 1.48 m (4' 10.25\") 96% No    BMI (Body Mass Index)                   30.46 kg/m2            Blood Pressure from Last 3 Encounters:   04/25/18 136/72   10/12/17 148/68   04/17/17 170/80    Weight from Last 3 Encounters:   04/25/18 66.7 kg (147 lb)   10/12/17 67.3 kg (148 lb 4.8 oz)   04/17/17 65.6 kg (144 lb 9.6 oz)              We Performed the Following     Basic metabolic panel     PNEUMOCOCCAL VACCINE,ADULT,SQ OR IM     VACCINE ADMINISTRATION, INITIAL          Today's Medication Changes          These changes are accurate as of 4/25/18  1:15 PM.  If you have any questions, ask your nurse or doctor.               These medicines have changed or have updated prescriptions.        Dose/Directions    hydrochlorothiazide 25 MG tablet   Commonly known as:  HYDRODIURIL   This may have changed:  See the new instructions.   Used for:  Hypertension goal BP (blood pressure) < 150/90   Changed by:  Juliette Ordaz MD        TAKE 1 TABLET(25 MG) BY MOUTH DAILY   Quantity:  90 tablet   Refills:  3       potassium chloride SA 20 MEQ CR tablet   Commonly known as:  K-DUR/KLOR-CON M   This may have changed:  See the new instructions.   Used for:  Hypertension goal BP (blood pressure) < 150/90   Changed by:  Juliette Ordaz MD        TAKE 1 TABLET(20 MEQ) BY MOUTH DAILY   Quantity:  90 tablet   Refills:  3       propranolol 80 MG tablet   Commonly known as:  INDERAL   This may have changed:  See the new instructions.   Used for:  Hypertension goal BP (blood pressure) < 150/90   Changed by:  Juliette Ordaz MD        TAKE 1 TABLET(80 MG) BY MOUTH TWICE DAILY   Quantity:  180 tablet   Refills:  3            Where to get your medicines      These medications were sent to [facility] [address] [place] [address] [address] AT [facility] [place] [address], [place] [address]     Phone:  [phone]     " amLODIPine 5 MG tablet    hydrochlorothiazide 25 MG tablet    potassium chloride SA 20 MEQ CR tablet    propranolol 80 MG tablet    thyroid 60 MG tablet         Some of these will need a paper prescription and others can be bought over the counter.  Ask your nurse if you have questions.     Bring a paper prescription for each of these medications     order for DME                Primary Care Provider Office Phone # Fax #    Juliette Ordaz [phone] [address] [facility] [address]  [facility] [address]        Equal Access to Services     DELMA PARNELL : Hadii aad ku hadasho Soomaali, waaxda luqadaha, qaybta kaalmada adeegyada, waxay idiin hayaan adeelinor sosaaradianna killian . So [facility] [phone].    ATENCIÓN: Si habla español, tiene a swann disposición servicios gratuitos de asistencia lingüística. Llame al [phone].    We comply with applicable federal civil rights laws and [place] laws. We do not discriminate on the basis of race, color, national origin, age, disability, sex, sexual orientation, or gender identity.            Thank you!     Thank you for choosing [facility]  for your care. Our goal is always to provide you with excellent care. Hearing back from our patients is one way we can continue to improve our services. Please take a few minutes to complete the written survey that you may receive in the mail after your visit with us. Thank you!             Your Updated Medication List - Protect others around you: Learn how to safely use, store and throw away your medicines at www.disposemymeds.org.          This list is accurate as of 4/25/18  1:15 PM.  Always use your most recent med list.                   Brand Name Dispense Instructions for use Diagnosis    acetaminophen 325 MG tablet    TYLENOL    100 tablet    Take 2 tablets (650 mg) by mouth 3 times daily And Every 4 hours prn    Hip pain, left       amLODIPine 5 MG tablet    NORVASC    90 tablet    Take 1 tablet (5  mg) by mouth daily    Hypertension goal BP (blood pressure) < 150/90       aspirin 325 MG EC tablet      Take 325 mg by mouth daily.        hydrochlorothiazide 25 MG tablet    HYDRODIURIL    90 tablet    TAKE 1 TABLET(25 MG) BY MOUTH DAILY    Hypertension goal BP (blood pressure) < 150/90       ibuprofen 200 MG tablet    ADVIL    100 tablet    Take 3 tablets (600 mg) by mouth 3 times daily (with meals) scheduled for 5 days and then every 6 hours as needed for pain.    Hip pain, left, Muscle strain of gluteal region, left, initial encounter       multivitamin, therapeutic with minerals Tabs tablet      Take 1 tablet by mouth daily.        order for DME     1 Device    Equipment being ordered: wheeled walker    Gait instability       order for DME     4 Device    Equipment being ordered: Knee high compression stockings 20-30 mmHg, measure to size    Bilateral leg edema       potassium chloride SA 20 MEQ CR tablet    K-DUR/KLOR-CON M    90 tablet    TAKE 1 TABLET(20 MEQ) BY MOUTH DAILY    Hypertension goal BP (blood pressure) < 150/90       propranolol 80 MG tablet    INDERAL    180 tablet    TAKE 1 TABLET(80 MG) BY MOUTH TWICE DAILY    Hypertension goal BP (blood pressure) < 150/90       thyroid 60 MG tablet    ARMOUR THYROID    102 tablet    Take 1 tablet (60 mg) by mouth daily Except Take 1.5 tablets 2 days per week.    Acquired hypothyroidism

## 2018-04-25 NOTE — NURSING NOTE
"Chief Complaint   Patient presents with     Hypertension       Initial /72 (BP Location: Right arm, Patient Position: Chair, Cuff Size: Adult Large)  Pulse 70  Temp 98  F (36.7  C) (Oral)  Resp 18  Ht 1.48 m (4' 10.25\")  Wt 66.7 kg (147 lb)  SpO2 96%  Breastfeeding? No  BMI 30.46 kg/m2 Estimated body mass index is 30.46 kg/(m^2) as calculated from the following:    Height as of this encounter: 1.48 m (4' 10.25\").    Weight as of this encounter: 66.7 kg (147 lb).  Medication Reconciliation: complete     Autumn Brady MA       "

## 2018-04-25 NOTE — PATIENT INSTRUCTIONS
Call your insurance to discuss coverage of Shingrix (shingles vaccine). Return for nurse only visit or to a pharmacy to receive the vaccine.        At [facility], we strive to deliver an exceptional experience to you, every time we see you.  If you receive a survey in the mail, please send us back your thoughts. We really do value your feedback.    Suggested websites for health information:  Www.[place].org : Up to date and easily searchable information on multiple topics.  Www.medlineplus.gov : medication info, interactive tutorials, watch real surgeries online  Www.familydoctor.org : good info from the Academy of Family Physicians  Www.cdc.gov : public health info, travel advisories, epidemics (H1N1)  Www.aap.org : children's health info, normal development, vaccinations  Www.health.[facility].mn.us : [place] dept of health, public health issues in [place], N1N1    Your care team:     Family Medicine   RAS Cardoza MD Emily Bunt, APRN [facility]   S. MD Antonia Steve MD Angela Wermerskirchen, MD         Clinic hours: Monday - Wednesday 7 am-7 pm   Thursdays and Fridays 7 am-5 pm.     [facility]: Monday - Friday 11 am-9 pm,   Saturday and Sunday 9 am-5 pm.    [facility]: Monday -Thursday 8 am-8 pm; Friday 8 am-6 pm; Saturday and Sunday 9 am-5 pm.     [facility]: Monday - Thursday 8 am - 7 pm; Friday 8 am - 6 pm    Clinic: [phone]   [facility]: [phone]   [facility]: [phone]

## 2018-04-25 NOTE — NURSING NOTE
Screening Questionnaire for Adult Immunization    Are you sick today?   No   Do you have allergies to medications, food, a vaccine component or latex?   Yes   Have you ever had a serious reaction after receiving a vaccination?   No   Do you have a long-term health problem with heart disease, lung disease, asthma, kidney disease, metabolic disease (e.g. diabetes), anemia, or other blood disorder?   No   Do you have cancer, leukemia, HIV/AIDS, or any other immune system problem?   No   In the past 3 months, have you taken medications that affect  your immune system, such as prednisone, other steroids, or anticancer drugs; drugs for the treatment of rheumatoid arthritis, Crohn s disease, or psoriasis; or have you had radiation treatments?   No   Have you had a seizure, or a brain or other nervous system problem?   No   During the past year, have you received a transfusion of blood or blood     products, or been given immune (gamma) globulin or antiviral drug?   No   For women: Are you pregnant or is there a chance you could become        pregnant during the next month?   No   Have you received any vaccinations in the past 4 weeks?   No     Immunization questionnaire answers were all negative.         Patient instructed to remain in clinic for 15 minutes afterwards, and to report any adverse reaction to me immediately.       Screening performed by Tonya Dao on 4/25/2018 at 1:21 PM.

## 2018-04-25 NOTE — LETTER
April 27, 2018      Ania Cortez  [address]  [place] [address]        Dear Ania,     Your recent labs look good. No change in your medication is needed.         Sincerely,        Juliette Ordaz MD      Results for orders placed or performed in visit on 04/25/18   Basic metabolic panel   Result Value Ref Range    Sodium 139 133 - 144 mmol/L    Potassium 4.0 3.4 - 5.3 mmol/L    Chloride 101 94 - 109 mmol/L    Carbon Dioxide 33 (H) 20 - 32 mmol/L    Anion Gap 5 3 - 14 mmol/L    Glucose 84 70 - 99 mg/dL    Urea Nitrogen 17 7 - 30 mg/dL    Creatinine 0.74 0.52 - 1.04 mg/dL    GFR Estimate 74 >60 mL/min/1.7m2    GFR Estimate If Black 89 >60 mL/min/1.7m2    Calcium 9.6 8.5 - 10.1 mg/dL

## 2018-04-25 NOTE — PROGRESS NOTES
"  SUBJECTIVE:   Ania Cortez is a 89 year old female who presents to clinic today for the following health issues:    Hypertension Follow-up      Outpatient blood pressures are being checked at home.  Results are \"its ok\".    Low Salt Diet: not monitoring salt    Hypothyroidism Follow-up      Since last visit, patient describes the following symptoms: fatigue    Amount of exercise or physical activity: None    Problems taking medications regularly: No    Medication side effects: none    Diet: regular (no restrictions)    Pt is accompanied by her daughter in law.    Daughter in law  mentions Ania has friends now in her building and she has been spending more time with others.  She has been getting around her apartment alright and has not had any falls. Only using her walker as support to get out of bed. She is not using her walker when walking outside of her apartment as she doesn't feel she needs it. Daughter in law says Ania is steady on her feet and walks slowly.    -Has meds lined up on one side of the counter and when she takes meds she moves her pills to the other side, when she takes afternoon medication she will move pills back to the other side of the counter. Daughter in law thinks she is taking her medication most days. System works well    Weight: Making most of her own meals as she doesn't like the food given at her senior living home. She says appetite is low but weight is stable. She often drinks V8 or has vegetable soup in order to get veggies in her diet.  Wt Readings from Last 5 Encounters:   04/25/18 66.7 kg (147 lb)   10/12/17 67.3 kg (148 lb 4.8 oz)   04/17/17 65.6 kg (144 lb 9.6 oz)   09/19/16 65.5 kg (144 lb 8 oz)   03/17/16 62.1 kg (137 lb)       BP: pt has had some intermittent edema  but is wearing her compression stockings to help with this.  Denies: cp, sob,   BP Readings from Last 3 Encounters:   04/25/18 136/72   10/12/17 148/68   04/17/17 170/80     Fatigue: Pt is feeling tired often " and associates this with her thyroid. She would like to have a higher thyroid medication dose. She is sleeping 9 hours a night and often falls asleep watching tv. Pt does not feel tired/fall asleep when she is around others.    Vaccines -- daughter in law thinks she has only received 1 of 2 pneumonia vaccines. Pt has not received the shingles vaccine and they are unsure of last tetanus. Had shingles 3 years ago. Patient is very concerned about vaccines making her ill    Problem list and histories reviewed & adjusted, as indicated.  Additional history: as documented    Patient Active Problem List   Diagnosis     S/P rotator cuff surgery     Osteoarthritis     Hip pain     Acquired hypothyroidism     Benign essential hypertension     Advanced directives, counseling/discussion     Dementia     Gait instability     Past Surgical History:   Procedure Laterality Date     BREAST SURGERY       CATARACT IOL, RT/LT       CHOLECYSTECTOMY       GYN SURGERY      hystorectomy       Social History   Substance Use Topics     Smoking status: Never Smoker     Smokeless tobacco: Never Used     Alcohol use Yes      Comment: rarely     History reviewed. No pertinent family history.      Current Outpatient Prescriptions   Medication Sig Dispense Refill     acetaminophen (TYLENOL) 325 MG tablet Take 2 tablets (650 mg) by mouth 3 times daily And Every 4 hours prn 100 tablet 0     amLODIPine (NORVASC) 5 MG tablet Take 1 tablet (5 mg) by mouth daily 90 tablet 3     aspirin  MG tablet Take 325 mg by mouth daily.       hydrochlorothiazide (HYDRODIURIL) 25 MG tablet TAKE 1 TABLET(25 MG) BY MOUTH DAILY 90 tablet 0     ibuprofen (ADVIL) 200 MG tablet Take 3 tablets (600 mg) by mouth 3 times daily (with meals) scheduled for 5 days and then every 6 hours as needed for pain. 100 tablet 0     multivitamin, therapeutic with minerals (THERA-VIT-M) TABS Take 1 tablet by mouth daily.       order for DME Equipment being ordered: Knee high  "compression stockings 20-30 mmHg, measure to size 4 Device 3     order for DME Equipment being ordered: wheeled walker 1 Device 0     potassium chloride SA (K-DUR/KLOR-CON M) 20 MEQ CR tablet TAKE 1 TABLET(20 MEQ) BY MOUTH DAILY 90 tablet 0     propranolol (INDERAL) 80 MG tablet TAKE 1 TABLET(80 MG) BY MOUTH TWICE DAILY 180 tablet 0     thyroid (ARMOUR THYROID) 60 MG tablet Take 1 tablet (60 mg) by mouth daily Except Take 1.5 tablets 2 days per week. 102 tablet 1     Allergies   Allergen Reactions     Penicillins      Sulfa Drugs        Reviewed and updated as needed this visit by clinical staff  Tobacco  Allergies  Meds  Med Hx  Surg Hx  Fam Hx  Soc Hx      Reviewed and updated as needed this visit by Provider Tobacco  Allergies  Meds  Med Hx  Surg Hx  Fam Hx  Soc Hx            ROS:  Constitutional, HEENT, cardiovascular, pulmonary, gi and gu systems are negative, except as otherwise noted.    This document serves as a record of the services and decisions personally performed and made by Juliette Ordaz MD. It was created on her behalf by Sandra Zuluaga, a trained medical scribe. The creation of this document is based the provider's statements to the medical scribe.  Sandra Zuluaga April 25, 2018 12:53 PM      OBJECTIVE:     /72 (BP Location: Right arm, Patient Position: Chair, Cuff Size: Adult Large)  Pulse 70  Temp 98  F (36.7  C) (Oral)  Resp 18  Ht 1.48 m (4' 10.25\")  Wt 66.7 kg (147 lb)  SpO2 96%  Breastfeeding? No  BMI 30.46 kg/m2  Body mass index is 30.46 kg/(m^2).  GENERAL: healthy, alert and no distress, overweight  EYES: Eyes grossly normal to inspection, PERRL and conjunctivae and sclerae normal  HENT: ear canals and TM's normal, nose and mouth without ulcers or lesions  NECK: no adenopathy, no asymmetry, masses, or scars and thyroid normal to palpation  RESP: lungs clear to auscultation - no rales, rhonchi or wheezes  CV: regular rate and rhythm, normal S1 S2, no S3 " or S4, no murmur, click or rub, no peripheral edema and peripheral pulses strong  ABDOMEN: soft, nontender, no hepatosplenomegaly, no masses and bowel sounds normal  SKIN: no suspicious lesions or rashes to visible skin  PSYCH: mentation at baseline, affect normal/bright    Diagnostic Test Results:  No results found for this or any previous visit (from the past 24 hour(s)).    Component      Latest Ref Rng & Units 4/17/2017 10/12/2017 1/9/2018   Sodium      133 - 144 mmol/L 136 137    Potassium      3.4 - 5.3 mmol/L 3.6 3.8    Chloride      94 - 109 mmol/L 97 97    Carbon Dioxide      20 - 32 mmol/L 33 (H) 31    Anion Gap      3 - 14 mmol/L 6 9    Glucose      70 - 99 mg/dL 96 107 (H)    Urea Nitrogen      7 - 30 mg/dL 18 17    Creatinine      0.52 - 1.04 mg/dL 0.65 0.64    GFR Estimate      >60 mL/min/1.7m2 86 87    GFR Estimate If Black      >60 mL/min/1.7m2 >90 . . . >90    Calcium      8.5 - 10.1 mg/dL 9.4 9.6    WBC      4.0 - 11.0 10e9/L  10.6    RBC Count      3.8 - 5.2 10e12/L  4.79    Hemoglobin      11.7 - 15.7 g/dL  13.8    Hematocrit      35.0 - 47.0 %  41.9    MCV      78 - 100 fl  88    MCH      26.5 - 33.0 pg  28.8    MCHC      31.5 - 36.5 g/dL  32.9    RDW      10.0 - 15.0 %  13.4    Platelet Count      150 - 450 10e9/L  343    Vitamin B12      193 - 986 pg/mL 851     TSH      0.40 - 4.00 mU/L 2.94 3.55 1.74   Triiodothyronine (T3)      60 - 181 ng/dL 95 116 137     ASSESSMENT/PLAN:     1. Hypertension goal BP (blood pressure) < 150/90   Controlled. Continue same medication.   - amLODIPine (NORVASC) 5 MG tablet; Take 1 tablet (5 mg) by mouth daily  Dispense: 90 tablet; Refill: 3  - hydrochlorothiazide (HYDRODIURIL) 25 MG tablet; TAKE 1 TABLET(25 MG) BY MOUTH DAILY  Dispense: 90 tablet; Refill: 3  - potassium chloride SA (K-DUR/KLOR-CON M) 20 MEQ CR tablet; TAKE 1 TABLET(20 MEQ) BY MOUTH DAILY  Dispense: 90 tablet; Refill: 3  - propranolol (INDERAL) 80 MG tablet; TAKE 1 TABLET(80 MG) BY MOUTH TWICE  DAILY  Dispense: 180 tablet; Refill: 3  - Basic metabolic panel    2. Acquired hypothyroidism   Controlled. Continue same medication.   - thyroid (ARMOUR THYROID) 60 MG tablet; Take 1 tablet (60 mg) by mouth daily Except Take 1.5 tablets 2 days per week.  Dispense: 102 tablet; Refill: 3    3. Bilateral leg edema   pt is to continue wearing compression stockings. Refill given  - order for DME; Equipment being ordered: Knee high compression stockings 20-30 mmHg, measure to size  Dispense: 4 Device; Refill: 3    4. Dementia without behavioral disturbance, unspecified dementia type  daughter in law has no acute concerns today. Continue to monitor. Will consider more intensive care if worsens.    5. Gait instability   pt is not using her walker. Strongly advised using this to prevent falls but she is apprehensive.    6. Need for pneumococcal vaccine  Patient does agree to second pneumonia vaccine since she did well with the first. Also encouraged shingles vaccine at pharmacy (she does not want more than 1 vaccine today)  - PNEUMOCOCCAL VACCINE,ADULT,SQ OR IM  - VACCINE ADMINISTRATION, INITIAL    Discussed shingrix vaccine with pt. They will return later this year or to their pharmacy to receive the vaccine.    Med check q 6 mo    Patient Instructions     Call your insurance to discuss coverage of Shingrix (shingles vaccine). Return for nurse only visit or to a pharmacy to receive the vaccine.        At [facility], we strive to deliver an exceptional experience to you, every time we see you.  If you receive a survey in the mail, please send us back your thoughts. We really do value your feedback.    Suggested websites for health information:  Www.[place].org : Up to date and easily searchable information on multiple topics.  Www.medlineplus.gov : medication info, interactive tutorials, watch real surgeries online  Www.familydoctor.org : good info from the Academy of Family Physicians  Www.cdc.gov :  public health info, travel advisories, epidemics (H1N1)  Www.aap.org : children's health info, normal development, vaccinations  Www.health.[facility].[place].us : [place] dept of health, public health issues in [place], N1N1    Your care team:     Family Medicine   RAS Cardoza MD Emily Bunt, LUCY DURAN   S. MD Antonia Steve MD Angela Wermerskirchen, MD         Clinic hours: Monday - Wednesday 7 am-7 pm   Thursdays and Fridays 7 am-5 pm.     [facility]: Monday - Friday 11 am-9 pm,   Saturday and Sunday 9 am-5 pm.    [facility]: Monday -Thursday 8 am-8 pm; Friday 8 am-6 pm; Saturday and Sunday 9 am-5 pm.     [facility]: Monday - Thursday 8 am - 7 pm; Friday 8 am - 6 pm    Clinic: [phone]   [facility]: [phone]   [facility]: [phone]              The information in this document, created by the medical scribe for me, accurately reflects the services I personally performed and the decisions made by me. I have reviewed and approved this document for accuracy.   MD Juliette Lino MD  [facility]

## 2018-07-24 ENCOUNTER — TELEPHONE (OUTPATIENT)
Dept: FAMILY MEDICINE | Facility: CLINIC | Age: 83
End: 2018-07-24

## 2018-07-24 DIAGNOSIS — E03.9 ACQUIRED HYPOTHYROIDISM: ICD-10-CM

## 2018-07-24 DIAGNOSIS — I10 HYPERTENSION GOAL BP (BLOOD PRESSURE) < 150/90: ICD-10-CM

## 2018-07-24 NOTE — TELEPHONE ENCOUNTER
"Requested Prescriptions   Pending Prescriptions Disp Refills     propranolol (INDERAL) 80 MG tablet [Pharmacy Med Name: PROPRANOLOL 80MG TABLETS]  Last Written Prescription Date:  4/25/18  Last Fill Quantity: 180 tablet,  # refills: 3   Last office visit: 4/25/2018 with prescribing provider:  Dr. Ordaz   Future Office Visit:   180 tablet 0     Sig: TAKE 1 TABLET(80 MG) BY MOUTH TWICE DAILY    Beta-Blockers Protocol Passed    7/24/2018 11:49 AM       Passed - Blood pressure under 140/90 in past 12 months    BP Readings from Last 3 Encounters:   04/25/18 136/72   10/12/17 148/68   04/17/17 170/80                Passed - Patient is age 6 or older       Passed - Recent (12 mo) or future (30 days) visit within the authorizing provider's specialty    Patient had office visit in the last 12 months or has a visit in the next 30 days with authorizing provider or within the authorizing provider's specialty.  See \"Patient Info\" tab in inbasket, or \"Choose Columns\" in Meds & Orders section of the refill encounter.       ....           ARMOUR THYROID 30 MG tablet [Pharmacy Med Name: THYROID (ARMOUR) 0.5GR (30MG) TABS] 26 tablet 0     Sig: TAKE 1 TABLET BY MOUTH DAILY FOR 2 DAYS A WEEK ALONG WITH A 60MG TABLET    Thyroid Protocol Passed    7/24/2018 11:49 AM       Passed - Patient is 12 years or older       Passed - Recent (12 mo) or future (30 days) visit within the authorizing provider's specialty    Patient had office visit in the last 12 months or has a visit in the next 30 days with authorizing provider or within the authorizing provider's specialty.  See \"Patient Info\" tab in inbasket, or \"Choose Columns\" in Meds & Orders section of the refill encounter.           Passed - Normal TSH on file in past 12 months    Recent Labs   Lab Test  01/09/18   1311   TSH  1.74             Passed - No active pregnancy on record    If patient is pregnant or has had a positive pregnancy test, please check TSH.         Passed - No " "positive pregnancy test in past 12 months    If patient is pregnant or has had a positive pregnancy test, please check TSH.                  HIRA THYROID 60 MG tablet [Pharmacy Med Name: THYROID (ARMOUR) 1GR  (60MG)  TABS]  Last Written Prescription Date:  4/25/18  Last Fill Quantity: 102 tablet,  # refills: 3   Last office visit: 4/25/2018 with prescribing provider:  Dr. Ordaz   Future Office Visit:   90 tablet 0     Sig: TAKE 1 TABLET BY MOUTH DAILY. EXCEPT FOR 2 DAYS PER WEEK, TAKE ALONG WITH A 30MG TABLET    Thyroid Protocol Passed    7/24/2018 11:49 AM       Passed - Patient is 12 years or older       Passed - Recent (12 mo) or future (30 days) visit within the authorizing provider's specialty    Patient had office visit in the last 12 months or has a visit in the next 30 days with authorizing provider or within the authorizing provider's specialty.  See \"Patient Info\" tab in inbasket, or \"Choose Columns\" in Meds & Orders section of the refill encounter.           Passed - Normal TSH on file in past 12 months    Recent Labs   Lab Test  01/09/18   1311   TSH  1.74             Passed - No active pregnancy on record    If patient is pregnant or has had a positive pregnancy test, please check TSH.         Passed - No positive pregnancy test in past 12 months    If patient is pregnant or has had a positive pregnancy test, please check TSH.            "

## 2018-07-25 RX ORDER — THYROID 30 MG/1
TABLET ORAL
Qty: 26 TABLET | Refills: 0 | OUTPATIENT
Start: 2018-07-25

## 2018-07-25 RX ORDER — PROPRANOLOL HYDROCHLORIDE 80 MG/1
TABLET ORAL
Qty: 180 TABLET | Refills: 0 | OUTPATIENT
Start: 2018-07-25

## 2018-07-25 RX ORDER — THYROID 60 MG
TABLET ORAL
Qty: 90 TABLET | Refills: 0 | OUTPATIENT
Start: 2018-07-25

## 2018-07-25 NOTE — TELEPHONE ENCOUNTER
Medication Detail      Disp Refills Start End BRANDEN   thyroid (ARMOUR THYROID) 60 MG tablet 102 tablet 3 4/25/2018  No   Sig - Route: Take 1 tablet (60 mg) by mouth daily Except Take 1.5 tablets 2 days per week. - Oral   Class: E-Prescribe   Order: [id number]   E-Prescribing Status: Receipt confirmed by pharmacy (4/25/2018  1:04 PM CDT     Team, a year supply of Propranolol and [place] Thyroid (60 mg) tab have been refilled for a year supply at requesting pharmacy. Patient may be requesting refill from old Rx number.     Please contact patient to instruct them to call their pharmacy and directly speak with a pharmacy team member to request refill of medications.     (Patient also requesting a 30 mg of [place] Thyroid, but this appears to not be active on medication list).     Judy Lu RN

## 2018-07-25 NOTE — TELEPHONE ENCOUNTER
I called and confirmed with pharmacy that they received both rx's that were sent on 4/25/2018.    I called daughter of pt and notified her that pharmacy has rx's and that she can call pharmacy to fill rx's. SEE Edwards

## 2018-07-26 NOTE — TELEPHONE ENCOUNTER
"Requested Prescriptions   Pending Prescriptions Disp Refills     ARMOUR THYROID 30 MG tablet [Pharmacy Med Name: THYROID (ARMOUR) 0.5GR (30MG) TABS]  Last Written Prescription Date:  4/25/18  Last Fill Quantity: 102 tablet,  # refills: 3   Last office visit: 4/25/2018 with prescribing provider:  Dr. Ordaz   Future Office Visit:   26 tablet 0     Sig: TAKE 1 TABLET BY MOUTH DAILY FOR 2 DAYS A WEEK ALONG WITH A 60MG TABLET    Thyroid Protocol Passed    7/26/2018  2:14 PM       Passed - Patient is 12 years or older       Passed - Recent (12 mo) or future (30 days) visit within the authorizing provider's specialty    Patient had office visit in the last 12 months or has a visit in the next 30 days with authorizing provider or within the authorizing provider's specialty.  See \"Patient Info\" tab in inbasket, or \"Choose Columns\" in Meds & Orders section of the refill encounter.           Passed - Normal TSH on file in past 12 months    Recent Labs   Lab Test  01/09/18   1311   TSH  1.74             Passed - No active pregnancy on record    If patient is pregnant or has had a positive pregnancy test, please check TSH.         Passed - No positive pregnancy test in past 12 months    If patient is pregnant or has had a positive pregnancy test, please check TSH.            "

## 2018-07-31 RX ORDER — THYROID 30 MG/1
TABLET ORAL
Qty: 26 TABLET | Refills: 0 | OUTPATIENT
Start: 2018-07-31

## 2018-07-31 NOTE — TELEPHONE ENCOUNTER
Need to verify that the pharmacy has the 4/25/18 Rx on file for the patient.    Bhavna Sy RN, [facility]

## 2018-07-31 NOTE — TELEPHONE ENCOUNTER
Medication is being denied due to: incorrect dose; patient now takes 60 mg. Verified with pharmacy, has refills.    Nidia Pineda RN

## 2018-10-10 ENCOUNTER — OFFICE VISIT (OUTPATIENT)
Dept: FAMILY MEDICINE | Facility: CLINIC | Age: 83
End: 2018-10-10
Payer: MEDICARE

## 2018-10-10 VITALS
BODY MASS INDEX: 29.81 KG/M2 | HEART RATE: 69 BPM | DIASTOLIC BLOOD PRESSURE: 75 MMHG | WEIGHT: 142 LBS | RESPIRATION RATE: 16 BRPM | OXYGEN SATURATION: 96 % | TEMPERATURE: 97.8 F | HEIGHT: 58 IN | SYSTOLIC BLOOD PRESSURE: 187 MMHG

## 2018-10-10 DIAGNOSIS — Z23 NEED FOR PROPHYLACTIC VACCINATION AND INOCULATION AGAINST INFLUENZA: ICD-10-CM

## 2018-10-10 DIAGNOSIS — E03.9 ACQUIRED HYPOTHYROIDISM: ICD-10-CM

## 2018-10-10 DIAGNOSIS — R21 RASH: ICD-10-CM

## 2018-10-10 DIAGNOSIS — Z23 NEED FOR PROPHYLACTIC VACCINATION WITH TETANUS-DIPHTHERIA (TD): ICD-10-CM

## 2018-10-10 DIAGNOSIS — I10 BENIGN ESSENTIAL HYPERTENSION: Primary | ICD-10-CM

## 2018-10-10 DIAGNOSIS — R26.81 GAIT INSTABILITY: ICD-10-CM

## 2018-10-10 DIAGNOSIS — H53.8 BLURRED VISION: ICD-10-CM

## 2018-10-10 DIAGNOSIS — F03.90 DEMENTIA WITHOUT BEHAVIORAL DISTURBANCE, UNSPECIFIED DEMENTIA TYPE: ICD-10-CM

## 2018-10-10 LAB
ANION GAP SERPL CALCULATED.3IONS-SCNC: 6 MMOL/L (ref 3–14)
BUN SERPL-MCNC: 17 MG/DL (ref 7–30)
CALCIUM SERPL-MCNC: 9.4 MG/DL (ref 8.5–10.1)
CHLORIDE SERPL-SCNC: 101 MMOL/L (ref 94–109)
CO2 SERPL-SCNC: 33 MMOL/L (ref 20–32)
CREAT SERPL-MCNC: 0.65 MG/DL (ref 0.52–1.04)
GFR SERPL CREATININE-BSD FRML MDRD: 86 ML/MIN/1.7M2
GLUCOSE SERPL-MCNC: 92 MG/DL (ref 70–99)
POTASSIUM SERPL-SCNC: 3.8 MMOL/L (ref 3.4–5.3)
SODIUM SERPL-SCNC: 140 MMOL/L (ref 133–144)
TSH SERPL DL<=0.005 MIU/L-ACNC: 2.1 MU/L (ref 0.4–4)

## 2018-10-10 PROCEDURE — 36415 COLL VENOUS BLD VENIPUNCTURE: CPT | Performed by: FAMILY MEDICINE

## 2018-10-10 PROCEDURE — 99214 OFFICE O/P EST MOD 30 MIN: CPT | Mod: 25 | Performed by: FAMILY MEDICINE

## 2018-10-10 PROCEDURE — 90662 IIV NO PRSV INCREASED AG IM: CPT | Performed by: FAMILY MEDICINE

## 2018-10-10 PROCEDURE — 90471 IMMUNIZATION ADMIN: CPT | Performed by: FAMILY MEDICINE

## 2018-10-10 PROCEDURE — 80048 BASIC METABOLIC PNL TOTAL CA: CPT | Performed by: FAMILY MEDICINE

## 2018-10-10 PROCEDURE — 84443 ASSAY THYROID STIM HORMONE: CPT | Performed by: FAMILY MEDICINE

## 2018-10-10 RX ORDER — TRIAMCINOLONE ACETONIDE 1 MG/G
OINTMENT TOPICAL
Qty: 80 G | Refills: 0 | Status: SHIPPED | OUTPATIENT
Start: 2018-10-10 | End: 2021-01-12

## 2018-10-10 NOTE — PROGRESS NOTES
SUBJECTIVE:   Ania Cortez is a 89 year old female who presents to clinic today for the following health issues:      Hypertension Follow-up       Outpatient blood pressures are being checked at home.  Results are 130's-140's/60's-70's.    Low Salt Diet: not monitoring salt    -Patient did not take her Norvasc today  -Has intermittent migraines, denies new headaches or dizziness. With patient's migraines over the last 10-15 years she has more blurred vision than pain. She has not had an eye exam in the last year     Hypothyroidism Follow-up      Since last visit, patient describes the following symptoms: fatigue      Amount of exercise or physical activity: None    Problems taking medications regularly: No    Medication side effects: none    Diet: regular (no restrictions)    -Patient feels she needs a higher dose of synthroid because of her fatigue. This is a common complaint from her at visits.     TSH   Date Value Ref Range Status   01/09/2018 1.74 0.40 - 4.00 mU/L Final       Weight:  -Reports decreased appetite though caregiver present reports patient eats well. Patient drinks Boost on days when she has not eaten a regular meal     Wt Readings from Last 4 Encounters:   10/10/18 64.4 kg (142 lb)   04/25/18 66.7 kg (147 lb)   10/12/17 67.3 kg (148 lb 4.8 oz)   04/17/17 65.6 kg (144 lb 9.6 oz)       Additional:  -Denies recent falls, patient is cautious and works to avoid falls  -Is concerned about bowel movements. Patient reports she is more prone to constipation but recently had 3 bowel movements in one day. She has not tried fiber supplements. Appetite at baseline. No blood in stools or significant abd pain.   - manging well at her current apartment. Daughter in law present with her today has no new concerns.         Problem list and histories reviewed & adjusted, as indicated.  Additional history: as documented    Patient Active Problem List   Diagnosis     S/P rotator cuff surgery     Osteoarthritis     Hip  "pain     Acquired hypothyroidism     Benign essential hypertension     Advanced directives, counseling/discussion     Dementia     Gait instability     Past Surgical History:   Procedure Laterality Date     BREAST SURGERY       CATARACT IOL, RT/LT       CHOLECYSTECTOMY       GYN SURGERY      hystorectomy       Social History   Substance Use Topics     Smoking status: Never Smoker     Smokeless tobacco: Never Used     Alcohol use Yes      Comment: rarely     History reviewed. No pertinent family history.        Reviewed and updated as needed this visit by clinical staff  Tobacco  Allergies  Meds  Med Hx  Surg Hx  Fam Hx  Soc Hx      Reviewed and updated as needed this visit by Provider         ROS:  Constitutional, HEENT, cardiovascular, pulmonary, gi and gu systems are negative, except as otherwise noted.    This document serves as a record of the services and decisions personally performed by ARLEEN BLAND. It was created on his/her behalf by Everette Brown, a trained medical scribe. The creation of this document is based on the provider's statements to the medical scribe. Everette Brown, October 10, 2018 1:55 PM  OBJECTIVE:     /75 (BP Location: Left arm, Patient Position: Sitting, Cuff Size: Adult Regular)  Pulse 69  Temp 97.8  F (36.6  C) (Oral)  Resp 16  Ht 1.48 m (4' 10.25\")  Wt 64.4 kg (142 lb)  SpO2 96%  BMI 29.42 kg/m2  Body mass index is 29.42 kg/(m^2).  GENERAL: healthy, alert and no distress  RESP: lungs clear to auscultation - no rales, rhonchi or wheezes  CV: regular rate and rhythm, normal S1 S2, no S3 or S4, no murmur, click or rub, no peripheral edema and peripheral pulses strong  ABDOMEN: soft, nontender, no hepatosplenomegaly, no masses and bowel sounds normal  MS: no gross musculoskeletal defects noted, no edema. Wearing compression stockings   SKIN: Scaley erythematous patch left posterior neck hairline  PSYCH: mentation at baseline, affect " normal/bright    ASSESSMENT/PLAN:   1. Benign essential hypertension  Stable. Continue current medications  - Basic metabolic panel    2. Acquired hypothyroidism  Adjust therapy based on labs  - TSH with free T4 reflex; Future    3. Blurred vision  ? Ocular migraines, recommended follow-up with optometry     4. Dementia without behavioral disturbance, unspecified dementia type  Stable     5. Rash  - triamcinolone (KENALOG) 0.1 % ointment; Apply sparingly to affected area two times daily prn  Dispense: 80 g; Refill: 0    6. Gait instability  No recent falls     7. Need for prophylactic vaccination with tetanus-diphtheria (Td)  Patient declined vaccination as she feels she is not at risk because she does not go outside often. Discussed risks of declining vaccination     8. Need for prophylactic vaccination and inoculation against influenza  - FLU VACCINE, INCREASED ANTIGEN, PRESV FREE, AGE 65+ [40793]  -      ADMIN VACCINE, FIRST [04664]      Patient Instructions   Schedule an eye exam to make sure there is nothing else triggering your blurred vision.     Start powdered fiber such as Metamucil or Citricel: start 1 tsp per day, and increase by 1 tsp per week to goal total dosing of 1-2 tablespoons per day. Drink plenty of water daily for fiber to be effective.    Fruits that are helpful with constipation are pears, peaches, prunes, and plums.     Call your insurance to discuss coverage of Shingrix (shingles vaccine). Return for a medical assistant only visit or to your pharmacy to receive the vaccine. If you receive it at the pharmacy, send us a record of it.           The information in this document, created by the medical scribe for me, accurately reflects the services I personally performed and the decisions made by me. I have reviewed and approved this document for accuracy.   Juliette Ordaz MD  [facility]

## 2018-10-10 NOTE — MR AVS SNAPSHOT
After Visit Summary   10/10/2018    Ania Cortez    MRN: [id number]           Patient Information     Date Of Birth          12/25/1926        Visit Information        Provider Department      10/10/2018 1:40 PM Juliette Ordaz MD [facility]        Today's Diagnoses     Benign essential hypertension    -  1    Acquired hypothyroidism        Blurred vision        Dementia without behavioral disturbance, unspecified dementia type        Need for prophylactic vaccination with tetanus-diphtheria (Td)        Gait instability        Need for prophylactic vaccination and inoculation against influenza        Rash          Care Instructions    Schedule an eye exam to make sure there is nothing else triggering your blurred vision.     Start powdered fiber such as Metamucil or Citricel: start 1 tsp per day, and increase by 1 tsp per week to goal total dosing of 1-2 tablespoons per day. Drink plenty of water daily for fiber to be effective.    Fruits that are helpful with constipation are pears, peaches, prunes, and plums.     Call your insurance to discuss coverage of Shingrix (shingles vaccine). Return for a medical assistant only visit or to your pharmacy to receive the vaccine. If you receive it at the pharmacy, send us a record of it.           Follow-ups after your visit        Follow-up notes from your care team     Return in about 6 months (around 4/10/2019).      Future tests that were ordered for you today     Open Future Orders        Priority Expected Expires Ordered    TSH with free T4 reflex Routine  4/12/2019 10/10/2018            Who to contact     If you have questions or need follow up information about today's clinic visit or your schedule please contact [facility] directly at [phone].  Normal or non-critical lab and imaging results will be communicated to you by MyChart, letter or phone within 4 business days after the clinic has received the  "results. If you do not hear from us within 7 days, please contact the clinic through [employer] or phone. If you have a critical or abnormal lab result, we will notify you by phone as soon as possible.  Submit refill requests through [employer] or call your pharmacy and they will forward the refill request to us. Please allow 3 business days for your refill to be completed.          Additional Information About Your Visit        [employer] Information     [employer] lets you send messages to your doctor, view your test results, renew your prescriptions, schedule appointments and more. To sign up, go to [email or web address] . Click on \"Log in\" on the left side of the screen, which will take you to the Welcome page. Then click on \"Sign up Now\" on the right side of the page.     You will be asked to enter the access code listed below, as well as some personal information. Please follow the directions to create your username and password.     Your access code is: [email or web address]  Expires: 2019  2:14 PM     Your access code will  in 90 days. If you need help or a new code, please call your [place] clinic or [phone].        Care EveryWhere ID     This is your Care EveryWhere ID. This could be used by other organizations to access your [place] medical records  [id number]        Your Vitals Were     Pulse Temperature Respirations Height Pulse Oximetry BMI (Body Mass Index)    69 97.8  F (36.6  C) (Oral) 16 1.48 m (4' 10.25\") 96% 29.42 kg/m2       Blood Pressure from Last 3 Encounters:   10/10/18 187/75   18 136/72   10/12/17 148/68    Weight from Last 3 Encounters:   10/10/18 64.4 kg (142 lb)   18 66.7 kg (147 lb)   10/12/17 67.3 kg (148 lb 4.8 oz)              We Performed the Following          ADMIN VACCINE, FIRST [09923]     Basic metabolic panel     FLU VACCINE, INCREASED ANTIGEN, PRESV FREE, AGE 65+ [09585]          Today's Medication Changes          These changes are accurate as of 10/10/18  2:14 " PM.  If you have any questions, ask your nurse or doctor.               Start taking these medicines.        Dose/Directions    triamcinolone 0.1 % ointment   Commonly known as:  KENALOG   Used for:  Rash   Started by:  Juliette Ordaz MD        Apply sparingly to affected area two times daily prn   Quantity:  80 g   Refills:  0            Where to get your medicines      [facility] [address] [place] [address]  AT [facility] & [address] , [facility] [address]     Phone:  [phone]     triamcinolone 0.1 % ointment                Primary Care Provider Office Phone # Fax #    Juliette Ordaz [phone] [address]  [facility] [address]        Equal Access to Services     [facility]: Hadii skylar ku hadasho Soomaali, waaxda luqadaha, qaybta kaalmada adeegyada, derek golsdtein hayaalinn killian . So [facility] [phone].    ATENCIÓN: Si habla español, tiene a swann disposición servicios gratuitos de asistencia lingüística. [facility] [phone].    We comply with applicable federal civil rights laws and [place] laws. We do not discriminate on the basis of race, color, national origin, age, disability, sex, sexual orientation, or gender identity.            Thank you!     Thank you for choosing [facility]  for your care. Our goal is always to provide you with excellent care. Hearing back from our patients is one way we can continue to improve our services. Please take a few minutes to complete the written survey that you may receive in the mail after your visit with us. Thank you!             Your Updated Medication List - Protect others around you: Learn how to safely use, store and throw away your medicines at www.disposemymeds.org.          This list is accurate as of 10/10/18  2:14 PM.  Always use your most recent med list.                   Brand Name Dispense  Instructions for use Diagnosis    acetaminophen 325 MG tablet    TYLENOL    100 tablet    Take 2 tablets (650 mg) by mouth 3 times daily And Every 4 hours prn    Hip pain, left       amLODIPine 5 MG tablet    NORVASC    90 tablet    Take 1 tablet (5 mg) by mouth daily    Hypertension goal BP (blood pressure) < 150/90       aspirin 325 MG EC tablet      Take 325 mg by mouth daily.        hydrochlorothiazide 25 MG tablet    HYDRODIURIL    90 tablet    TAKE 1 TABLET(25 MG) BY MOUTH DAILY    Hypertension goal BP (blood pressure) < 150/90       ibuprofen 200 MG tablet    ADVIL    100 tablet    Take 3 tablets (600 mg) by mouth 3 times daily (with meals) scheduled for 5 days and then every 6 hours as needed for pain.    Hip pain, left, Muscle strain of gluteal region, left, initial encounter       multivitamin, therapeutic with minerals Tabs tablet      Take 1 tablet by mouth daily.        order for DME     1 Device    Equipment being ordered: wheeled walker    Gait instability       order for DME     4 Device    Equipment being ordered: Knee high compression stockings 20-30 mmHg, measure to size    Bilateral leg edema       potassium chloride SA 20 MEQ CR tablet    K-DUR/KLOR-CON M    90 tablet    TAKE 1 TABLET(20 MEQ) BY MOUTH DAILY    Hypertension goal BP (blood pressure) < 150/90       propranolol 80 MG tablet    INDERAL    180 tablet    TAKE 1 TABLET(80 MG) BY MOUTH TWICE DAILY    Hypertension goal BP (blood pressure) < 150/90       thyroid 60 MG tablet    ARMOUR THYROID    102 tablet    Take 1 tablet (60 mg) by mouth daily Except Take 1.5 tablets 2 days per week.    Acquired hypothyroidism       triamcinolone 0.1 % ointment    KENALOG    80 g    Apply sparingly to affected area two times daily prn    Rash

## 2018-10-10 NOTE — LETTER
October 10, 2018      Ania Cortez  [address]  [place] [address]        Dear Ania,     It was a pleasure seeing you at your recent visit. Your labs have been reviewed and are attached.     The kidney and electrolyte panel was normal.   Your thyroid test was normal range. Please continue your current thyroid medication and dosing.           Sincerely,        Juliette Ordaz MD      Results for orders placed or performed in visit on 10/10/18   Basic metabolic panel   Result Value Ref Range    Sodium 140 133 - 144 mmol/L    Potassium 3.8 3.4 - 5.3 mmol/L    Chloride 101 94 - 109 mmol/L    Carbon Dioxide 33 (H) 20 - 32 mmol/L    Anion Gap 6 3 - 14 mmol/L    Glucose 92 70 - 99 mg/dL    Urea Nitrogen 17 7 - 30 mg/dL    Creatinine 0.65 0.52 - 1.04 mg/dL    GFR Estimate 86 >60 mL/min/1.7m2    GFR Estimate If Black >90 >60 mL/min/1.7m2    Calcium 9.4 8.5 - 10.1 mg/dL   TSH with free T4 reflex   Result Value Ref Range    TSH 2.10 0.40 - 4.00 mU/L

## 2018-10-10 NOTE — PROGRESS NOTES

## 2018-10-10 NOTE — PATIENT INSTRUCTIONS
Schedule an eye exam to make sure there is nothing else triggering your blurred vision.     Start powdered fiber such as Metamucil or Citricel: start 1 tsp per day, and increase by 1 tsp per week to goal total dosing of 1-2 tablespoons per day. Drink plenty of water daily for fiber to be effective.    Fruits that are helpful with constipation are pears, peaches, prunes, and plums.     Call your insurance to discuss coverage of Shingrix (shingles vaccine). Return for a medical assistant only visit or to your pharmacy to receive the vaccine. If you receive it at the pharmacy, send us a record of it.

## 2019-02-12 ENCOUNTER — TELEPHONE (OUTPATIENT)
Dept: FAMILY MEDICINE | Facility: CLINIC | Age: 84
End: 2019-02-12

## 2019-02-12 DIAGNOSIS — E03.9 ACQUIRED HYPOTHYROIDISM: Primary | ICD-10-CM

## 2019-02-12 RX ORDER — THYROID 30 MG/1
TABLET ORAL
Qty: 24 TABLET | Refills: 3 | Status: SHIPPED | OUTPATIENT
Start: 2019-02-12 | End: 2019-11-22 | Stop reason: DRUGHIGH

## 2019-02-12 NOTE — TELEPHONE ENCOUNTER
Reason for call:  Other     Patient called regarding (reason for call): prescription to go to [facility] [phone]    Additional comments: the way the prescription is written they want the patient to cut the pills in half for her days she takes 1.5 tablets. The pills are crumbling when they cut them and they are wasting medication    Can you rewrite the prescription to give her some 30mg tabs for the 2 days she takes more  Phone number to reach patient:  [phone]    Best Time:  any    Can we leave a detailed message on this number?  YES

## 2019-04-26 DIAGNOSIS — I10 HYPERTENSION GOAL BP (BLOOD PRESSURE) < 150/90: ICD-10-CM

## 2019-04-26 NOTE — TELEPHONE ENCOUNTER
"Requested Prescriptions   Pending Prescriptions Disp Refills     propranolol (INDERAL) 80 MG tablet [Pharmacy Med Name: PROPRANOLOL 80MG TABLETS] 180 tablet 0     Sig: TAKE 1 TABLET(80 MG) BY MOUTH TWICE DAILY       Beta-Blockers Protocol Failed - 4/26/2019 12:29 PM        Failed - Blood pressure under 140/90 in past 12 months     BP Readings from Last 3 Encounters:   10/10/18 187/75   04/25/18 136/72   10/12/17 148/68                 Passed - Patient is age 6 or older        Passed - Recent (12 mo) or future (30 days) visit within the authorizing provider's specialty     Patient had office visit in the last 12 months or has a visit in the next 30 days with authorizing provider or within the authorizing provider's specialty.  See \"Patient Info\" tab in inbasket, or \"Choose Columns\" in Meds & Orders section of the refill encounter.              Passed - Medication is active on med list        propranolol (INDERAL) 80 MG tablet  Last Written Prescription Date:  4/25/18  Last Fill Quantity: 180,  # refills: 3   Last office visit: 10/10/2018 with prescribing provider:  Dr. Ordaz   Future Office Visit:      "

## 2019-04-29 RX ORDER — PROPRANOLOL HYDROCHLORIDE 80 MG/1
TABLET ORAL
Qty: 180 TABLET | Refills: 0 | Status: SHIPPED | OUTPATIENT
Start: 2019-04-29 | End: 2019-05-22

## 2019-04-29 NOTE — TELEPHONE ENCOUNTER
Routing refill request to provider for review/approval because:  Blood pressure in last office visit on 10/10/18 was greater than 140/90  Marguerite Castellon RN

## 2019-05-22 ENCOUNTER — OFFICE VISIT (OUTPATIENT)
Dept: FAMILY MEDICINE | Facility: CLINIC | Age: 84
End: 2019-05-22
Payer: MEDICARE

## 2019-05-22 VITALS
HEIGHT: 58 IN | HEART RATE: 63 BPM | WEIGHT: 138 LBS | OXYGEN SATURATION: 96 % | DIASTOLIC BLOOD PRESSURE: 62 MMHG | BODY MASS INDEX: 28.97 KG/M2 | SYSTOLIC BLOOD PRESSURE: 140 MMHG | RESPIRATION RATE: 16 BRPM | TEMPERATURE: 98.1 F

## 2019-05-22 DIAGNOSIS — I10 HYPERTENSION GOAL BP (BLOOD PRESSURE) < 150/90: Primary | ICD-10-CM

## 2019-05-22 DIAGNOSIS — R26.81 GAIT INSTABILITY: ICD-10-CM

## 2019-05-22 DIAGNOSIS — E03.9 ACQUIRED HYPOTHYROIDISM: ICD-10-CM

## 2019-05-22 DIAGNOSIS — F03.90 DEMENTIA WITHOUT BEHAVIORAL DISTURBANCE, UNSPECIFIED DEMENTIA TYPE: ICD-10-CM

## 2019-05-22 LAB
ANION GAP SERPL CALCULATED.3IONS-SCNC: 9 MMOL/L (ref 3–14)
BUN SERPL-MCNC: 13 MG/DL (ref 7–30)
CALCIUM SERPL-MCNC: 9.5 MG/DL (ref 8.5–10.1)
CHLORIDE SERPL-SCNC: 101 MMOL/L (ref 94–109)
CO2 SERPL-SCNC: 26 MMOL/L (ref 20–32)
CREAT SERPL-MCNC: 0.55 MG/DL (ref 0.52–1.04)
GFR SERPL CREATININE-BSD FRML MDRD: 81 ML/MIN/{1.73_M2}
GLUCOSE SERPL-MCNC: 88 MG/DL (ref 70–99)
POTASSIUM SERPL-SCNC: 3.5 MMOL/L (ref 3.4–5.3)
SODIUM SERPL-SCNC: 136 MMOL/L (ref 133–144)
T3 SERPL-MCNC: 107 NG/DL (ref 60–181)
TSH SERPL DL<=0.005 MIU/L-ACNC: 3.78 MU/L (ref 0.4–4)

## 2019-05-22 PROCEDURE — 84443 ASSAY THYROID STIM HORMONE: CPT | Performed by: FAMILY MEDICINE

## 2019-05-22 PROCEDURE — 80048 BASIC METABOLIC PNL TOTAL CA: CPT | Performed by: FAMILY MEDICINE

## 2019-05-22 PROCEDURE — 84480 ASSAY TRIIODOTHYRONINE (T3): CPT | Performed by: FAMILY MEDICINE

## 2019-05-22 PROCEDURE — 99214 OFFICE O/P EST MOD 30 MIN: CPT | Performed by: FAMILY MEDICINE

## 2019-05-22 PROCEDURE — 36415 COLL VENOUS BLD VENIPUNCTURE: CPT | Performed by: FAMILY MEDICINE

## 2019-05-22 RX ORDER — AMLODIPINE BESYLATE 5 MG/1
5 TABLET ORAL DAILY
Qty: 90 TABLET | Refills: 3 | Status: SHIPPED | OUTPATIENT
Start: 2019-05-22 | End: 2020-06-18

## 2019-05-22 RX ORDER — THYROID 60 MG/1
60 TABLET ORAL DAILY
Qty: 90 TABLET | Refills: 1 | Status: SHIPPED | OUTPATIENT
Start: 2019-05-22 | End: 2019-11-22

## 2019-05-22 RX ORDER — PROPRANOLOL HYDROCHLORIDE 80 MG/1
TABLET ORAL
Qty: 180 TABLET | Refills: 3 | Status: SHIPPED | OUTPATIENT
Start: 2019-05-22 | End: 2020-07-24

## 2019-05-22 RX ORDER — POTASSIUM CHLORIDE 1500 MG/1
TABLET, EXTENDED RELEASE ORAL
Qty: 90 TABLET | Refills: 3 | Status: SHIPPED | OUTPATIENT
Start: 2019-05-22 | End: 2020-06-18

## 2019-05-22 RX ORDER — HYDROCHLOROTHIAZIDE 25 MG/1
TABLET ORAL
Qty: 90 TABLET | Refills: 3 | Status: SHIPPED | OUTPATIENT
Start: 2019-05-22 | End: 2020-06-18

## 2019-05-22 ASSESSMENT — MIFFLIN-ST. JEOR: SCORE: 921.74

## 2019-05-22 NOTE — LETTER
May 24, 2019      Ania Cortez  [address]  [place] [address]        Dear Ania,     It was a pleasure seeing you at your recent visit. Your labs have been reviewed and are attached.     The kidney and electrolyte panel was normal.   Your thyroid tests are normal range. Please continue your current armour thyroid doses.       Sincerely,        Juliette Ordaz MD      Results for orders placed or performed in visit on 05/22/19   TSH with free T4 reflex   Result Value Ref Range    TSH 3.78 0.40 - 4.00 mU/L   T3, total   Result Value Ref Range    Triiodothyronine (T3) 107 60 - 181 ng/dL   Basic metabolic panel   Result Value Ref Range    Sodium 136 133 - 144 mmol/L    Potassium 3.5 3.4 - 5.3 mmol/L    Chloride 101 94 - 109 mmol/L    Carbon Dioxide 26 20 - 32 mmol/L    Anion Gap 9 3 - 14 mmol/L    Glucose 88 70 - 99 mg/dL    Urea Nitrogen 13 7 - 30 mg/dL    Creatinine 0.55 0.52 - 1.04 mg/dL    GFR Estimate 81 >60 mL/min/[1.73_m2]    GFR Estimate If Black >90 >60 mL/min/[1.73_m2]    Calcium 9.5 8.5 - 10.1 mg/dL

## 2019-05-22 NOTE — PATIENT INSTRUCTIONS
It's okay to take your amlodipine in the morning (it doesn't have to be taken at noon).     Update me if you would like a  from our office call you about resources.

## 2019-05-22 NOTE — PROGRESS NOTES
Subjective     Ania Cortez is a 89 year old female who presents to clinic today for the following health issues:    HPI   Hypertension Follow-up      Do you check your blood pressure regularly outside of the clinic? Yes     Are you following a low salt diet? No    Are your blood pressures ever more than 140 on the top number (systolic) OR more   than 90 on the bottom number (diastolic), for example 140/90? Yes, AVG. 130-150's/60-80's       Hypothyroidism Follow-up      Since last visit, patient describes the following symptoms: Dry skin, fatigue and hair loss      Amount of exercise or physical activity: None    Problems taking medications regularly: No    Medication side effects: none    Diet: regular (no restrictions)      Patient Active Problem List   Diagnosis     S/P rotator cuff surgery     Osteoarthritis     Hip pain     Acquired hypothyroidism     Benign essential hypertension     Advanced directives, counseling/discussion     Dementia     Gait instability     Past Surgical History:   Procedure Laterality Date     BREAST SURGERY       CATARACT IOL, RT/LT       CHOLECYSTECTOMY       GYN SURGERY      hystorectomy       Social History     Tobacco Use     Smoking status: Never Smoker     Smokeless tobacco: Never Used   Substance Use Topics     Alcohol use: Yes     Comment: rarely     History reviewed. No pertinent family history.      Current Outpatient Medications   Medication Sig Dispense Refill     acetaminophen (TYLENOL) 325 MG tablet Take 2 tablets (650 mg) by mouth 3 times daily And Every 4 hours prn 100 tablet 0     amLODIPine (NORVASC) 5 MG tablet Take 1 tablet (5 mg) by mouth daily 90 tablet 3     aspirin  MG tablet Take 325 mg by mouth daily.       hydrochlorothiazide (HYDRODIURIL) 25 MG tablet TAKE 1 TABLET(25 MG) BY MOUTH DAILY 90 tablet 3     ibuprofen (ADVIL) 200 MG tablet Take 3 tablets (600 mg) by mouth 3 times daily (with meals) scheduled for 5 days and then every 6 hours as needed for  "pain. 100 tablet 0     multivitamin, therapeutic with minerals (THERA-VIT-M) TABS Take 1 tablet by mouth daily.       order for DME Equipment being ordered: Knee high compression stockings 20-30 mmHg, measure to size 4 Device 3     order for DME Equipment being ordered: wheeled walker 1 Device 0     potassium chloride ER (K-DUR/KLOR-CON M) 20 MEQ CR tablet TAKE 1 TABLET(20 MEQ) BY MOUTH DAILY 90 tablet 3     propranolol (INDERAL) 80 MG tablet TAKE 1 TABLET(80 MG) BY MOUTH TWICE DAILY 180 tablet 3     thyroid (ARMOUR THYROID) 60 MG tablet Take 1 tablet (60 mg) by mouth daily 90 tablet 1     thyroid (ARMOUR) 30 MG tablet One tablet by mouth two days a week 24 tablet 3     triamcinolone (KENALOG) 0.1 % ointment Apply sparingly to affected area two times daily prn 80 g 0     Allergies   Allergen Reactions     Penicillins      Sulfa Drugs        No recent falls  Appetite is good.  Trying to eat healthy and keep weight down.       Daughter in law thinks she may forget to take her noon  Amlodipine and occasionally the extra doses of the armour thyroid.     [facility]  Patient notes she does not go down for meals that she does not like the food there and generally will eat microwave meals.   assisted living is an option, but family don't feel she is quite in need of that.   Patient's daughter-in-law notesHer paperwork is getting out of control, but she has been refusing to allow family to help her        Reviewed and updated as needed this visit by Provider      Tobacco  Allergies  Meds  Problems  Med Hx  Surg Hx  Fam Hx         Review of Systems   ROS COMP: Constitutional, HEENT, cardiovascular, pulmonary, gi and gu systems are negative, except as otherwise noted.      Objective    /62 (BP Location: Right arm, Patient Position: Sitting, Cuff Size: Adult Large)   Pulse 63   Temp 98.1  F (36.7  C) (Oral)   Resp 16   Ht 1.467 m (4' 9.75\")   Wt 62.6 kg (138 lb)   SpO2 96%   " BMI 29.09 kg/m    Body mass index is 29.09 kg/m .  Physical Exam   GENERAL: healthy, alert and no distress  NECK: no adenopathy, no asymmetry, masses, or scars and thyroid normal to palpation  RESP: lungs clear to auscultation - no rales, rhonchi or wheezes  CV: regular rate and rhythm, normal S1 S2, no S3 or S4, no murmur, click or rub, no peripheral edema and peripheral pulses strong  ABDOMEN: soft, nontender, no hepatosplenomegaly, no masses and bowel sounds normal  MS: no gross musculoskeletal defects noted, no edema  Psych: affect is full and mood is upbeat.  She is a limited historian.    Diagnostic Test Results:  Labs reviewed in Epic        Assessment & Plan     1. Hypertension goal BP (blood pressure) < 150/90  At goal, continue current medications  - propranolol (INDERAL) 80 MG tablet; TAKE 1 TABLET(80 MG) BY MOUTH TWICE DAILY  Dispense: 180 tablet; Refill: 3  - amLODIPine (NORVASC) 5 MG tablet; Take 1 tablet (5 mg) by mouth daily  Dispense: 90 tablet; Refill: 3  - hydrochlorothiazide (HYDRODIURIL) 25 MG tablet; TAKE 1 TABLET(25 MG) BY MOUTH DAILY  Dispense: 90 tablet; Refill: 3  - potassium chloride ER (K-DUR/KLOR-CON M) 20 MEQ CR tablet; TAKE 1 TABLET(20 MEQ) BY MOUTH DAILY  Dispense: 90 tablet; Refill: 3  - Basic metabolic panel    2. Gait instability  No recent falls.    4. Acquired hypothyroidism  Patient notes some fatigue.  It is unclear how many doses she misses a week of this medication  - thyroid (ARMOUR THYROID) 60 MG tablet; Take 1 tablet (60 mg) by mouth daily  Dispense: 90 tablet; Refill: 1  - TSH with free T4 reflex  - T3, total    5.  Dementia: Discussed paperwork concerns with her daughter in law and patient.  Patient voices she would be willing to have her son help her once a week.  I also discussed that our care coordination  could be in contact with family members if they are looking for additional resources.  Patient and her daughter-in-law are not interested in medication  "for her memory at this time.    BMI:   Estimated body mass index is 29.09 kg/m  as calculated from the following:    Height as of this encounter: 1.467 m (4' 9.75\").    Weight as of this encounter: 62.6 kg (138 lb).           Patient Instructions   It's okay to take your amlodipine in the morning (it doesn't have to be taken at noon).     Update me if you would like a  from our office call you about resources.       No follow-ups on file.    Juliette Ordaz MD  [facility]      \CC letter to family (see demographics).     "

## 2019-06-23 DIAGNOSIS — I10 HYPERTENSION GOAL BP (BLOOD PRESSURE) < 150/90: ICD-10-CM

## 2019-06-24 NOTE — TELEPHONE ENCOUNTER
"Requested Prescriptions   Pending Prescriptions Disp Refills     hydrochlorothiazide (HYDRODIURIL) 25 MG tablet [Pharmacy Med Name: HYDROCHLOROTHIAZIDE 25MG TABLETS]  Last Written Prescription Date:  5/22/19  Last Fill Quantity: 90 tablet,  # refills: 3   Last office visit: 5/22/2019 with prescribing provider:  Dr. Ordaz   Future Office Visit:     90 tablet 0     Sig: TAKE 1 TABLET(25 MG) BY MOUTH DAILY       Diuretics (Including Combos) Protocol Failed - 6/23/2019  2:03 PM        Failed - Blood pressure under 140/90 in past 12 months     BP Readings from Last 3 Encounters:   05/22/19 140/62   10/10/18 187/75   04/25/18 136/72                 Passed - Recent (12 mo) or future (30 days) visit within the authorizing provider's specialty     Patient had office visit in the last 12 months or has a visit in the next 30 days with authorizing provider or within the authorizing provider's specialty.  See \"Patient Info\" tab in inbasket, or \"Choose Columns\" in Meds & Orders section of the refill encounter.              Passed - Medication is active on med list        Passed - Patient is age 18 or older        Passed - No active pregancy on record        Passed - Normal serum creatinine on file in past 12 months     Recent Labs   Lab Test 05/22/19  1505   CR 0.55              Passed - Normal serum potassium on file in past 12 months     Recent Labs   Lab Test 05/22/19  1505   POTASSIUM 3.5                    Passed - Normal serum sodium on file in past 12 months     Recent Labs   Lab Test 05/22/19  1505                 Passed - No positive pregnancy test in past 12 months            amLODIPine (NORVASC) 5 MG tablet [Pharmacy Med Name: AMLODIPINE BESYLATE 5MG TABLETS]  Last Written Prescription Date:  5/22/19  Last Fill Quantity: 90 tablet,  # refills: 3   Last office visit: 5/22/2019 with prescribing provider:  Dr. Ordaz   Future Office Visit:     90 tablet 0     Sig: TAKE 1 TABLET(5 MG) BY MOUTH DAILY " "      Calcium Channel Blockers Protocol  Failed - 6/23/2019  2:03 PM        Failed - Blood pressure under 140/90 in past 12 months     BP Readings from Last 3 Encounters:   05/22/19 140/62   10/10/18 187/75   04/25/18 136/72                 Passed - Recent (12 mo) or future (30 days) visit within the authorizing provider's specialty     Patient had office visit in the last 12 months or has a visit in the next 30 days with authorizing provider or within the authorizing provider's specialty.  See \"Patient Info\" tab in inbasket, or \"Choose Columns\" in Meds & Orders section of the refill encounter.              Passed - Medication is active on med list        Passed - Patient is age 18 or older        Passed - No active pregnancy on record        Passed - Normal serum creatinine on file in past 12 months     Recent Labs   Lab Test 05/22/19  1505   CR 0.55             Passed - No positive pregnancy test in past 12 months            potassium chloride ER (K-DUR/KLOR-CON M) 20 MEQ CR tablet [Pharmacy Med Name: POTASSIUM CL 20MEQ ER TABLETS]  Last Written Prescription Date:  5/22/19  Last Fill Quantity: 90 tablet,  # refills: 3   Last office visit: 5/22/2019 with prescribing provider:  Dr. Ordaz   Future Office Visit:     90 tablet 0     Sig: TAKE 1 TABLET(20 MEQ) BY MOUTH DAILY       Potassium Supplements Protocol Passed - 6/23/2019  2:03 PM        Passed - Recent (12 mo) or future (30 days) visit within the authorizing provider's specialty     Patient had office visit in the last 12 months or has a visit in the next 30 days with authorizing provider or within the authorizing provider's specialty.  See \"Patient Info\" tab in inbasket, or \"Choose Columns\" in Meds & Orders section of the refill encounter.              Passed - Medication is active on med list        Passed - Patient is age 18 or older        Passed - Normal serum potassium in past 12 months     Recent Labs   Lab Test 05/22/19  1505   POTASSIUM 3.5    "

## 2019-06-26 RX ORDER — POTASSIUM CHLORIDE 1500 MG/1
TABLET, EXTENDED RELEASE ORAL
Qty: 90 TABLET | Refills: 0 | OUTPATIENT
Start: 2019-06-26

## 2019-06-26 RX ORDER — HYDROCHLOROTHIAZIDE 25 MG/1
TABLET ORAL
Qty: 90 TABLET | Refills: 0 | OUTPATIENT
Start: 2019-06-26

## 2019-06-26 RX ORDER — AMLODIPINE BESYLATE 5 MG/1
TABLET ORAL
Qty: 90 TABLET | Refills: 0 | OUTPATIENT
Start: 2019-06-26

## 2019-11-21 ENCOUNTER — OFFICE VISIT (OUTPATIENT)
Dept: FAMILY MEDICINE | Facility: CLINIC | Age: 84
End: 2019-11-21
Payer: MEDICARE

## 2019-11-21 VITALS
HEIGHT: 58 IN | OXYGEN SATURATION: 98 % | RESPIRATION RATE: 16 BRPM | WEIGHT: 138.8 LBS | TEMPERATURE: 97.8 F | BODY MASS INDEX: 29.14 KG/M2 | SYSTOLIC BLOOD PRESSURE: 166 MMHG | HEART RATE: 66 BPM | DIASTOLIC BLOOD PRESSURE: 69 MMHG

## 2019-11-21 DIAGNOSIS — Z91.81 PERSONAL HISTORY OF FALL: ICD-10-CM

## 2019-11-21 DIAGNOSIS — R26.81 GAIT INSTABILITY: ICD-10-CM

## 2019-11-21 DIAGNOSIS — E03.9 ACQUIRED HYPOTHYROIDISM: ICD-10-CM

## 2019-11-21 DIAGNOSIS — H53.9 VISION CHANGES: ICD-10-CM

## 2019-11-21 DIAGNOSIS — Z23 NEED FOR PROPHYLACTIC VACCINATION AND INOCULATION AGAINST INFLUENZA: ICD-10-CM

## 2019-11-21 DIAGNOSIS — I10 HYPERTENSION GOAL BP (BLOOD PRESSURE) < 150/90: Primary | ICD-10-CM

## 2019-11-21 DIAGNOSIS — F03.90 DEMENTIA WITHOUT BEHAVIORAL DISTURBANCE, UNSPECIFIED DEMENTIA TYPE: ICD-10-CM

## 2019-11-21 LAB
ANION GAP SERPL CALCULATED.3IONS-SCNC: 4 MMOL/L (ref 3–14)
BUN SERPL-MCNC: 19 MG/DL (ref 7–30)
CALCIUM SERPL-MCNC: 9.1 MG/DL (ref 8.5–10.1)
CHLORIDE SERPL-SCNC: 101 MMOL/L (ref 94–109)
CO2 SERPL-SCNC: 33 MMOL/L (ref 20–32)
CREAT SERPL-MCNC: 0.74 MG/DL (ref 0.52–1.04)
GFR SERPL CREATININE-BSD FRML MDRD: 70 ML/MIN/{1.73_M2}
GLUCOSE SERPL-MCNC: 113 MG/DL (ref 70–99)
POTASSIUM SERPL-SCNC: 3.9 MMOL/L (ref 3.4–5.3)
SODIUM SERPL-SCNC: 138 MMOL/L (ref 133–144)
T3 SERPL-MCNC: 106 NG/DL (ref 60–181)
T4 FREE SERPL-MCNC: 0.79 NG/DL (ref 0.76–1.46)
TSH SERPL DL<=0.005 MIU/L-ACNC: 4.59 MU/L (ref 0.4–4)

## 2019-11-21 PROCEDURE — 84480 ASSAY TRIIODOTHYRONINE (T3): CPT | Performed by: FAMILY MEDICINE

## 2019-11-21 PROCEDURE — 90662 IIV NO PRSV INCREASED AG IM: CPT | Performed by: FAMILY MEDICINE

## 2019-11-21 PROCEDURE — 80048 BASIC METABOLIC PNL TOTAL CA: CPT | Performed by: FAMILY MEDICINE

## 2019-11-21 PROCEDURE — 84443 ASSAY THYROID STIM HORMONE: CPT | Performed by: FAMILY MEDICINE

## 2019-11-21 PROCEDURE — 99214 OFFICE O/P EST MOD 30 MIN: CPT | Mod: 25 | Performed by: FAMILY MEDICINE

## 2019-11-21 PROCEDURE — 84439 ASSAY OF FREE THYROXINE: CPT | Performed by: FAMILY MEDICINE

## 2019-11-21 PROCEDURE — G0008 ADMIN INFLUENZA VIRUS VAC: HCPCS | Performed by: FAMILY MEDICINE

## 2019-11-21 PROCEDURE — 36415 COLL VENOUS BLD VENIPUNCTURE: CPT | Performed by: FAMILY MEDICINE

## 2019-11-21 ASSESSMENT — MIFFLIN-ST. JEOR: SCORE: 925.37

## 2019-11-21 NOTE — PROGRESS NOTES
"Subjective     Ania Cortez is a 89 year old female who presents to clinic with her daughter-in-law today for the following health issues:    HPI   Hypertension Follow-up    Do you check your blood pressure regularly outside of the clinic? Yes, just received a new BP cuff a week ago     Are you following a low salt diet? Yes    Are your blood pressures ever more than 140 on the top number (systolic) OR more   than 90 on the bottom number (diastolic), for example 140/90? Yes    -Pt has a new blood pressure cuff. Daughter-in-law states that her blood pressure is high today because she didn't take her Norvasc today, and questions if she remembers to take it some days. This is the only medication she takes at noon so it is easier to forget to take it.   -Pt sets up her own medications, feels that she remembers to take them most of the time. Her daughter-in-law fills her medications for her at the pharmacy.     BP Readings from Last 6 Encounters:   11/21/19 (!) 166/69   05/22/19 140/62   10/10/18 187/75   04/25/18 136/72   10/12/17 148/68   04/17/17 170/80     -Edema is about the same or a little worse. She wears compression stockings most of the time, but if doesn't wear them if she doesn't have a clean pair her legs will swell \"quite a bit\". Denies skin sores or redness, cp, SOB, bowel concerns, emesis, urinary changes. States she has 4 pairs of compression stockings which is enough for her.    -Pt wears a pad for occasional urinary leaking. If she has been sitting for a long period of time she will occasionally feel urgency and cannot get to the bathroom in time. This is her baseline.    Hypothyroidism Follow-up    Since last visit, patient describes the following symptoms: fatigue  -Patient feels she needs a higher dose of synthroid because of her fatigue. This is a common complaint from her previous visits.     -Pt notes that she doesn't have a large appetite and gains weight easily so she is careful about what she " eats. She likes to eat a little bit of dark chocolate for dessert.   Wt Readings from Last 4 Encounters:   11/21/19 63 kg (138 lb 12.8 oz)   05/22/19 62.6 kg (138 lb)   10/10/18 64.4 kg (142 lb)   04/25/18 66.7 kg (147 lb)       Ambulation:   -Requesting a prescription walker that has a seat. Pt states that the walker would be helpful to have the seat if she gets tired walking down a long hallway or if she goes somewhere without chairs.   -Feels that she is not as steady as she used to be and would like a walker to feel safe walking around and to avoid falls. About 3-4 years ago her legs gave out on her and she fell, went to the hospital. Denies falls since then.  -Pt states that she feels safe navigating in her apartment, but thinks that a 4-wheeled walker with a seat would be too big and unsafe to navigate inside her apartment. States she has a cane that she could use in her apartment, but she doesn't use it. Her daughter-in-law notes that there is a lot of clutter in her apartment and if she got rid of some things there would be more room for a walker.   -She has a walker at home by her bed to use for balance, but she doesn't move that one because it doesn't have wheels.   -She has a lifeline button by her bed, in her bathroom, and in her kitchen. She does not wear one.  -Pt lived in a senior living community, staff checks on her daily.     Eyes:   -Pt's daughter-in-law is requesting referral for eye exam. She can see well most of the time, but has prescription readers. Has a hx of cataracts.       Patient Active Problem List   Diagnosis     S/P rotator cuff surgery     Osteoarthritis     Hip pain     Acquired hypothyroidism     Benign essential hypertension     Advanced directives, counseling/discussion     Dementia (H)     Gait instability     Past Surgical History:   Procedure Laterality Date     BREAST SURGERY       CATARACT IOL, RT/LT       CHOLECYSTECTOMY       GYN SURGERY      hystorectomy       Social  "History     Tobacco Use     Smoking status: Never Smoker     Smokeless tobacco: Never Used   Substance Use Topics     Alcohol use: Yes     Comment: rarely     History reviewed. No pertinent family history.      Reviewed and updated as needed this visit by Provider         Review of Systems   ROS COMP: Constitutional, HEENT, cardiovascular, pulmonary, gi and gu systems are negative, except as otherwise noted.    This document serves as a record of the services and decisions personally performed by ARLEEN BLAND. It was created on his/her behalf by Perla Mckeon, a trained medical scribe. The creation of this document is based on the provider's statements to the medical scribe. Perla Mckeon, November 21, 2019 1:52 PM        Objective    BP (!) 166/69 (BP Location: Left arm)   Pulse 66   Temp 97.8  F (36.6  C) (Oral)   Resp 16   Ht 1.467 m (4' 9.75\")   Wt 63 kg (138 lb 12.8 oz)   SpO2 98%   BMI 29.26 kg/m    Body mass index is 29.26 kg/m .  Physical Exam   GENERAL: healthy, alert and no distress  MS: no gross musculoskeletal defects noted, no edema  CV: RRR, no murmu  Resp: CTA  BACK: no CVA tenderness, no paralumbar tenderness    Diagnostic Test Results:  Labs reviewed in Epic  Component      Latest Ref Rng & Units 5/22/2019   Sodium      133 - 144 mmol/L 136   Potassium      3.4 - 5.3 mmol/L 3.5   Chloride      94 - 109 mmol/L 101   Carbon Dioxide      20 - 32 mmol/L 26   Anion Gap      3 - 14 mmol/L 9   Glucose      70 - 99 mg/dL 88   Urea Nitrogen      7 - 30 mg/dL 13   Creatinine      0.52 - 1.04 mg/dL 0.55   GFR Estimate      >60 mL/min/1.73:m2 81   GFR Estimate If Black      >60 mL/min/1.73:m2 >90   Calcium      8.5 - 10.1 mg/dL 9.5   TSH      0.40 - 4.00 mU/L 3.78   Triiodothyronine (T3)      60 - 181 ng/dL 107           Assessment & Plan     1. Hypertension goal BP (blood pressure) < 150/90  Elevated today (166/69) with out amlodipine. Pt's daughter-in-law notes that she often forgets to " "take her noon dose of Norvasc because it is her only noon medication. Discussed taking Norvasc in the morning with her other medications to ensure she takes it. Encouraged to continue taking blood pressure at home. Follow-up in 6 months.   - Basic metabolic panel    2. Gait instability  3. Personal history of fall  Daughter-in-law notes that in the past with attempted home evaluations for safety pt has become upset and noncompliant. Her family will try their best to create a safe environment for her and declined an OT home eval at this time. Prescription given for walker with a seat which is appropriate as she can only walk short distances without needing to rest and needs to be able to get down hallway in her assisted living facility.   - order for DME; Equipment being ordered: 4 wheeled walker with seat  Dispense: 1 each; Refill: 0    4. Acquired hypothyroidism  Adjust therapy based on labs  - TSH with free T4 reflex  - T3, total    5. Dementia without behavioral disturbance, unspecified dementia type (H)  At baseline. famiily very involved and monitoring her safety. Discussed med changes to improve her compliance.     6. Vision changes  - OPHTHALMOLOGY ADULT REFERRAL    7. Need for prophylactic vaccination and inoculation against influenza  - INFLUENZA (HIGH DOSE) 3 VALENT VACCINE [73539]  - Vaccine Administration, Initial [32935]     BMI:   Estimated body mass index is 29.26 kg/m  as calculated from the following:    Height as of this encounter: 1.467 m (4' 9.75\").    Weight as of this encounter: 63 kg (138 lb 12.8 oz).       Patient Instructions     I suggest you downsize your belongings as much as possible to clear pathways for you in your apartment to use a walker.     A prescription given for walker. You can get this at a medical supply store.     Take Norvasc in the morning with your other morning medications    I recommend that you get the new shingles vaccine, Shingrix. Check with your insurance about " coverage. Check with your pharmacy if they have it in stock, or call us in a few weeks to see if we have a new supply.      At [facility], we strive to deliver an exceptional experience to you, every time we see you. If you receive a survey, please complete it as we do value your feedback.  If you have MyChart, you can expect to receive results automatically within 24 hours of their completion.  Your provider will send a note interpreting your results as well.   If you do not have MyChart, you should receive your results in about a week by mail.    Your care team:     Family Medicine   RAS Cardoza MD Emily Bunt, LUCY DURAN   S. MD Antonia Steve MD Angela Wermerskirchen, MD    Internal Medicine  John Hodgson MD coming 2020     Clinic hours: Monday - Wednesday 7 am-7 pm   Thursdays and Fridays 7 am-5 pm.     [facility]: Monday - Friday 11 am-9 pm,   Saturday and Sunday 9 am-5 pm.    [facility]: Monday -Thursday 8 am-8 pm; Friday 8 am-6 pm; Saturday and Sunday 9 am-5 pm.     [facility]: Monday - Thursday 8 am - 7 pm; Friday 8 am - 6 pm    Clinic: [phone]   [facility]: [phone] m [facility]: [phone]                Return in about 6 months (around 5/21/2020) for Routine Visit.      The information in this document, created by the medical scribe for me, accurately reflects the services I personally performed and the decisions made by me. I have reviewed and approved this document for accuracy.   Juliette Ordaz MD  [facility]

## 2019-11-21 NOTE — PATIENT INSTRUCTIONS
I suggest you downsize your belongings as much as possible to clear pathways for you in your apartment to use a walker.     A prescription given for walker. You can get this at a medical supply store.     Take Norvasc in the morning with your other morning medications    I recommend that you get the new shingles vaccine, Shingrix. Check with your insurance about coverage. Check with your pharmacy if they have it in stock, or call us in a few weeks to see if we have a new supply.      At [facility], we strive to deliver an exceptional experience to you, every time we see you. If you receive a survey, please complete it as we do value your feedback.  If you have MyChart, you can expect to receive results automatically within 24 hours of their completion.  Your provider will send a note interpreting your results as well.   If you do not have MyChart, you should receive your results in about a week by mail.    Your care team:     Family Medicine   RAS Cardoza MD Emily Bunt, APRN [facility]   S. MD Antonia Steve MD Angela Wermerskirchen, MD    Internal Medicine  John Hodgson MD coming 2020     Clinic hours: Monday - Wednesday 7 am-7 pm   Thursdays and Fridays 7 am-5 pm.     [facility]: Monday - Friday 11 am-9 pm,   Saturday and Sunday 9 am-5 pm.    [facility]: Monday -Thursday 8 am-8 pm; Friday 8 am-6 pm; Saturday and Sunday 9 am-5 pm.     [facility]: Monday - Thursday 8 am - 7 pm; Friday 8 am - 6 pm    Clinic: [phone]   [facility]: [phone]   [facility]: [phone]

## 2019-11-22 ENCOUNTER — TELEPHONE (OUTPATIENT)
Dept: FAMILY MEDICINE | Facility: CLINIC | Age: 84
End: 2019-11-22

## 2019-11-22 DIAGNOSIS — E03.9 ACQUIRED HYPOTHYROIDISM: ICD-10-CM

## 2019-11-22 RX ORDER — THYROID 60 MG/1
60 TABLET ORAL DAILY
Qty: 90 TABLET | Refills: 3 | Status: SHIPPED | OUTPATIENT
Start: 2019-11-22 | End: 2020-11-22

## 2019-11-22 RX ORDER — THYROID 15 MG/1
15 TABLET ORAL DAILY
Qty: 90 TABLET | Refills: 0 | Status: SHIPPED | OUTPATIENT
Start: 2019-11-22 | End: 2019-11-25

## 2019-11-22 NOTE — TELEPHONE ENCOUNTER
"[facility] pharmacy fax message to provider:   re: thyroid (ARMOUR) 15 MG tablet    \"PLEASE VERIFY THE SIG: WE RECEIVED THE RX FOR 60MG TABLETS, TO BE TAKEN WITH THIS RX.  THE TOTAL DOES WILL BE *75*MG, NOT 60MG.  WAS THAT YOUR INTENTION?  PLEASE CALL BACK TO CLARIFY. THANKS.\"    Phone # [phone]  "

## 2019-11-22 NOTE — TELEPHONE ENCOUNTER
This writer attempted to contact Ania on 11/22/19      Reason for call results and left message.      If patient calls back:   Registered Nurse called. Follow Triage Call workflow        Marguerite Castellon RN         Notes recorded by Juliette Ordaz MD on 11/22/2019 at 2:47 PM CST  Please call patient's son or daughter with update on lab results (patient with memory issues).   -Patient's thyroid test was slightly abnormal indicating she is not getting enough thyroid hormone.  We will need to make a small dose increase in her [place] Thyroid medication.  I would recommend increasing the total dose to 75 mg/day.  She will need to take a 60 mg tab +15 mg tab EVERY day (she will no longer be taking the 30 mg dose twice weekly).  I will send an updated Rx for the 15 mg tablet to her pharmacy.  She will need a recheck of thyroid labs in 2 to 3 months with lab only appointment.  -Kidney and electrolyte panel were normal.

## 2019-11-25 RX ORDER — THYROID 15 MG/1
15 TABLET ORAL DAILY
Qty: 90 TABLET | Refills: 0 | Status: SHIPPED | OUTPATIENT
Start: 2019-11-25 | End: 2020-02-20

## 2019-11-25 NOTE — TELEPHONE ENCOUNTER
Spoke with daughter in law after confirming consent to communicate on file. Reviewed results with her and told her where medication was sent.    Marguerite Castellon RN

## 2019-11-25 NOTE — TELEPHONE ENCOUNTER
Guillermo, daughter-in-law returned call    Best number to reach caller: Other phone number:  [phone]    Is it ok to leave a detailed message: YES

## 2019-11-25 NOTE — TELEPHONE ENCOUNTER
This writer attempted to contact patient's daughter on 11/25/19      Reason for call discuss lab results and left message.      If patient calls back:   Registered Nurse called. Follow Triage Call workflow        Kimberley Marc RN

## 2020-02-12 DIAGNOSIS — E03.9 ACQUIRED HYPOTHYROIDISM: ICD-10-CM

## 2020-02-12 LAB — TSH SERPL DL<=0.005 MIU/L-ACNC: 2.88 MU/L (ref 0.4–4)

## 2020-02-12 PROCEDURE — 36415 COLL VENOUS BLD VENIPUNCTURE: CPT | Performed by: FAMILY MEDICINE

## 2020-02-12 PROCEDURE — 84443 ASSAY THYROID STIM HORMONE: CPT | Performed by: FAMILY MEDICINE

## 2020-02-19 ENCOUNTER — TELEPHONE (OUTPATIENT)
Dept: FAMILY MEDICINE | Facility: CLINIC | Age: 85
End: 2020-02-19

## 2020-02-19 DIAGNOSIS — E03.9 ACQUIRED HYPOTHYROIDISM: ICD-10-CM

## 2020-02-19 NOTE — TELEPHONE ENCOUNTER
"Requested Prescriptions   Pending Prescriptions Disp Refills     ARMOUR THYROID 15 MG PO tablet [Pharmacy Med Name: THYROID (ARMOUR) 0.25GR (15MG) TABS] 90 tablet 0     Sig: TAKE ONE TABLET BY MOUTH DAILY( TAKE WITH 60MG TABLET FOR TOTAL DAILY DOSE OF 75MG)       Thyroid Protocol Passed - 2/19/2020 12:03 PM        Passed - Patient is 12 years or older        Passed - Recent (12 mo) or future (30 days) visit within the authorizing provider's specialty     Patient has had an office visit with the authorizing provider or a provider within the authorizing providers department within the previous 12 mos or has a future within next 30 days. See \"Patient Info\" tab in inbasket, or \"Choose Columns\" in Meds & Orders section of the refill encounter.              Passed - Medication is active on med list        Passed - Normal TSH on file in past 12 months     Recent Labs   Lab Test 02/12/20  1313   TSH 2.88              Passed - No active pregnancy on record     If patient is pregnant or has had a positive pregnancy test, please check TSH.          Passed - No positive pregnancy test in past 12 months     If patient is pregnant or has had a positive pregnancy test, please check TSH.          ARMOUR THYROID 15 MG PO tablet  Last Written Prescription Date:  11/25/19  Last Fill Quantity: 90,  # refills: 0   Last office visit: 11/21/2019 with prescribing provider:  Dr. Ordaz   Future Office Visit:      "

## 2020-02-19 NOTE — TELEPHONE ENCOUNTER
PA for thyroid (HIRA THYROID) 60 MG tablet    Plan does not cover medication.    Phone# [phone]    ID# 1    PA or alternative    Jocy Goodwin

## 2020-02-20 RX ORDER — THYROID,PORK 15 MG
TABLET ORAL
Qty: 90 TABLET | Refills: 1 | Status: SHIPPED | OUTPATIENT
Start: 2020-02-20 | End: 2020-08-21

## 2020-02-21 NOTE — TELEPHONE ENCOUNTER
Prescription approved per [facility] Refill Protocol.    Mariana Moreno RN  [facility]

## 2020-02-21 NOTE — TELEPHONE ENCOUNTER
No pa needed. Pharmacy process this medication to a supplemental plan instead of patient's insurance. Informed pharmacy to contact patient to obtain correct insurance info and reprocess their claim.

## 2020-02-21 NOTE — TELEPHONE ENCOUNTER
Central Prior Authorization Team  Phone: [phone]    PA Initiation    Medication: thyroid (ARMOUR THYROID) 60 MG tablet  Insurance Company: [employer] [place] - Phone [phone] Fax [phone]  Pharmacy Filling the Rx: [id number] [place], [place] - [address] AT [facility]  Filling Pharmacy Phone: [phone]  Filling Pharmacy Fax:    Start Date: 2/21/2020

## 2020-06-16 DIAGNOSIS — I10 HYPERTENSION GOAL BP (BLOOD PRESSURE) < 150/90: ICD-10-CM

## 2020-06-17 NOTE — TELEPHONE ENCOUNTER
.Reason for call:  Other   Patient called regarding (reason for call): prescription  Additional comments: fill scripts    Phone number to reach patient:  Home number on file [phone] (home)    Best Time:  anytime    Can we leave a detailed message on this number?  YES    Travel screening: Negative

## 2020-06-18 RX ORDER — HYDROCHLOROTHIAZIDE 25 MG/1
TABLET ORAL
Qty: 90 TABLET | Refills: 3 | Status: SHIPPED | OUTPATIENT
Start: 2020-06-18 | End: 2020-09-16

## 2020-06-18 RX ORDER — AMLODIPINE BESYLATE 5 MG/1
TABLET ORAL
Qty: 90 TABLET | Refills: 3 | Status: SHIPPED | OUTPATIENT
Start: 2020-06-18 | End: 2020-09-16

## 2020-06-18 RX ORDER — POTASSIUM CHLORIDE 1500 MG/1
TABLET, EXTENDED RELEASE ORAL
Qty: 90 TABLET | Refills: 0 | Status: SHIPPED | OUTPATIENT
Start: 2020-06-18 | End: 2020-09-16

## 2020-06-18 NOTE — TELEPHONE ENCOUNTER
Routing refill request to provider for review/approval because:  Failed BP reading.    Potassium filled for max per protocol.    Bhavna Sy RN, [facility] Triage

## 2020-07-21 DIAGNOSIS — I10 HYPERTENSION GOAL BP (BLOOD PRESSURE) < 150/90: ICD-10-CM

## 2020-07-24 RX ORDER — PROPRANOLOL HYDROCHLORIDE 80 MG/1
TABLET ORAL
Qty: 180 TABLET | Refills: 3 | Status: SHIPPED | OUTPATIENT
Start: 2020-07-24 | End: 2021-07-12

## 2020-08-20 DIAGNOSIS — E03.9 ACQUIRED HYPOTHYROIDISM: ICD-10-CM

## 2020-08-21 RX ORDER — THYROID,PORK 15 MG
TABLET ORAL
Qty: 90 TABLET | Refills: 0 | Status: SHIPPED | OUTPATIENT
Start: 2020-08-21 | End: 2020-11-22

## 2020-08-21 NOTE — TELEPHONE ENCOUNTER
Prescription approved per [facility] Refill Protocol.  Betsy Gordon RN  [facility]

## 2020-09-14 ENCOUNTER — TELEPHONE (OUTPATIENT)
Dept: FAMILY MEDICINE | Facility: CLINIC | Age: 85
End: 2020-09-14

## 2020-09-14 DIAGNOSIS — I10 HYPERTENSION GOAL BP (BLOOD PRESSURE) < 150/90: ICD-10-CM

## 2020-09-14 NOTE — TELEPHONE ENCOUNTER
Reason for Call:  Medication or medication refill:    Do you use a [facility]?  Name of the pharmacy and phone number for the current request:  [facility] #[id number] [address] AT [facility]    Name of the medication requested: hydrochlorothiazide (HYDRODIURIL) 25 MG tablet  amLODIPine (NORVASC) 5 MG tablet  potassium chloride ER (KLOR-CON M) 20 MEQ CR tablet      Other request: Aware refills are not due until end of month but asking early in case appointment is required.     Can we leave a detailed message on this number? YES    Phone number patient can be reached at:   Guillermo Cortez () [phone]       Best Time: Any    Call taken on 9/14/2020 at 1:30 PM by June Palacios

## 2020-09-14 NOTE — LETTER
[address]  [address]  [phone]    September 17, 2020      Ania Cortez  [address]  [place] [address]      Dear Ania,    We have refilled your medications.   We will need to see you for a virtual visit, in November.  Please call [phone] to schedule this appointment.      Thank you,    [facility]

## 2020-09-16 RX ORDER — POTASSIUM CHLORIDE 1500 MG/1
TABLET, EXTENDED RELEASE ORAL
Qty: 90 TABLET | Refills: 0 | Status: SHIPPED | OUTPATIENT
Start: 2020-09-16 | End: 2020-09-17

## 2020-09-16 RX ORDER — HYDROCHLOROTHIAZIDE 25 MG/1
25 TABLET ORAL DAILY
Qty: 90 TABLET | Refills: 0 | Status: SHIPPED | OUTPATIENT
Start: 2020-09-16 | End: 2020-12-16

## 2020-09-16 RX ORDER — AMLODIPINE BESYLATE 5 MG/1
TABLET ORAL
Qty: 90 TABLET | Refills: 0 | Status: SHIPPED | OUTPATIENT
Start: 2020-09-16 | End: 2020-12-16

## 2020-09-16 NOTE — TELEPHONE ENCOUNTER
Routing refill request to provider for review/approval because:  BP fails protocol.    Other medication refilled per protocol.    Bhavna Sy RN, [facility] Triage

## 2020-09-17 DIAGNOSIS — I10 HYPERTENSION GOAL BP (BLOOD PRESSURE) < 150/90: ICD-10-CM

## 2020-09-17 RX ORDER — POTASSIUM CHLORIDE 1500 MG/1
TABLET, EXTENDED RELEASE ORAL
Qty: 90 TABLET | Refills: 0 | Status: SHIPPED | OUTPATIENT
Start: 2020-09-17 | End: 2020-12-16

## 2020-09-17 NOTE — TELEPHONE ENCOUNTER
Prescription approved per [facility] Refill Protocol.    Luh Dozier RN

## 2020-11-19 ENCOUNTER — TELEPHONE (OUTPATIENT)
Dept: FAMILY MEDICINE | Facility: CLINIC | Age: 85
End: 2020-11-19

## 2020-11-19 DIAGNOSIS — E03.9 ACQUIRED HYPOTHYROIDISM: ICD-10-CM

## 2020-11-19 NOTE — LETTER
[address]  [address]  [phone]    November 23, 2020      Ania Cortez  [address]  [place] [address]      Dear Ania,    We have refilled your medications.   We will need to see you for a virtual visit, before any additional refills can be given.  Please call [phone] to schedule this appointment.      Thank you,    [facility]

## 2020-11-22 ENCOUNTER — TELEPHONE (OUTPATIENT)
Dept: FAMILY MEDICINE | Facility: CLINIC | Age: 85
End: 2020-11-22

## 2020-11-22 RX ORDER — THYROID 60 MG
TABLET ORAL
Qty: 90 TABLET | Refills: 0 | Status: SHIPPED | OUTPATIENT
Start: 2020-11-22 | End: 2021-02-23

## 2020-11-22 RX ORDER — THYROID,PORK 15 MG
TABLET ORAL
Qty: 90 TABLET | Refills: 0 | Status: SHIPPED | OUTPATIENT
Start: 2020-11-22 | End: 2021-02-23

## 2020-11-22 NOTE — TELEPHONE ENCOUNTER
Plan does not cover this medication. Please call plan at [phone] to initiate prior authorization or call/fax pharmacy to change medication at  [phone]. Patient ID # is [id number].            Jose Luis Reddy  [facility]

## 2020-11-22 NOTE — TELEPHONE ENCOUNTER
Pending Prescriptions:                       Disp   Refills    ARMOUR THYROID 15 MG tablet [Pharmacy Med*90 tab*0            Sig: TAKE 1 TABLET BY MOUTH DAILY( TAKE WITH 60MG           TABLET FOR TOTAL DAILY DOSE OF 75MG)    ARMOUR THYROID 60 MG tablet [Pharmacy Med*90 tab*0            Sig: TAKE 1 TABLET(60 MG) BY MOUTH DAILY    Medication is being filled for 1 time teresa refill only due to:  Patient is due for med check and tsh labs    Please call and help schedule.  Thank you!    Kiersten Alves RN

## 2020-11-23 ENCOUNTER — TELEPHONE (OUTPATIENT)
Dept: FAMILY MEDICINE | Facility: CLINIC | Age: 85
End: 2020-11-23

## 2020-11-23 NOTE — TELEPHONE ENCOUNTER
Central Prior Authorization Team   Phone: [phone]      PA Initiation    Medication: ARMOUR THYROID 15 MG tablet - INITIATED  Insurance Company: [employer] [place] - Phone [phone] Fax [phone]  Pharmacy [facility]illing the Rx: [id number] [place], [place] - [address] AT [facility]  Filling Pharmacy Phone: [phone]  Filling Pharmacy Fax: [phone]  Start Date: 11/23/2020

## 2020-11-23 NOTE — TELEPHONE ENCOUNTER
Central Prior Authorization Team   Phone: [phone]      PA Initiation    Medication: ARMOUR THYROID 60 MG tablet - INITIATED  Insurance Company: [employer] [place] - Phone [phone] Fax [phone]  Pharmacy Filling the Rx: [facility] #[id number] [place], [place] - [address] AT [facility]  Filling Pharmacy Phone: [phone]  Filling Pharmacy Fax: [phone]  Start Date: 11/23/2020

## 2020-11-25 NOTE — TELEPHONE ENCOUNTER
Central Prior Authorization Team   Phone: [phone]      Prior Authorization Not Needed per Insurance    11/24/2020  Medication: ARMOUR THYROID 15 MG tablet - NOT NEEDED  Insurance Company: [employer] [place] - Phone [phone] Fax [phone]  Expected CoPay:      [facility] the Rx: [id number] [place] [address] AT [facility]  Pharmacy Notified: Yes  Patient Notified: Yes    No pa needed. Pharmacy process this medication to a supplemental plan instead of patient's insurance. Informed pharmacy to contact patient to obtain correct insurance info and reprocess their claim. Pharmacy stated pt uses a discount card for this medication.

## 2020-11-25 NOTE — TELEPHONE ENCOUNTER
Central Prior Authorization Team   Phone: [phone]      Prior Authorization Not Needed per Insurance    11/24/2020  Medication: ARMOUR THYROID 60 MG tablet - NOT NEEDED  Insurance Company: [employer] [place] - Phone [phone] Fax [phone]  Expected CoPay:      Pharmacy Filling the Rx: [id number] [place] [address] AT [facility]  Pharmacy Notified: Yes  Patient Notified: Yes    No pa needed. Pharmacy process this medication to a supplemental plan instead of patient's insurance. Informed pharmacy to contact patient to obtain correct insurance info and reprocess their claim. Pharmacy stated pt uses a discount card for this medication.

## 2021-01-12 ENCOUNTER — VIRTUAL VISIT (OUTPATIENT)
Dept: FAMILY MEDICINE | Facility: CLINIC | Age: 86
End: 2021-01-12
Payer: MEDICARE

## 2021-01-12 ENCOUNTER — MYC MEDICAL ADVICE (OUTPATIENT)
Dept: FAMILY MEDICINE | Facility: CLINIC | Age: 86
End: 2021-01-12

## 2021-01-12 DIAGNOSIS — F03.90 DEMENTIA WITHOUT BEHAVIORAL DISTURBANCE, UNSPECIFIED DEMENTIA TYPE: ICD-10-CM

## 2021-01-12 DIAGNOSIS — E03.9 ACQUIRED HYPOTHYROIDISM: ICD-10-CM

## 2021-01-12 DIAGNOSIS — R26.81 GAIT INSTABILITY: ICD-10-CM

## 2021-01-12 DIAGNOSIS — I10 BENIGN ESSENTIAL HYPERTENSION: Primary | ICD-10-CM

## 2021-01-12 PROCEDURE — 99214 OFFICE O/P EST MOD 30 MIN: CPT | Mod: 95 | Performed by: FAMILY MEDICINE

## 2021-01-12 RX ORDER — POTASSIUM CHLORIDE 1500 MG/1
TABLET, EXTENDED RELEASE ORAL
Qty: 90 TABLET | Refills: 0 | Status: SHIPPED | OUTPATIENT
Start: 2021-01-12 | End: 2021-06-14

## 2021-01-12 RX ORDER — HYDROCHLOROTHIAZIDE 25 MG/1
25 TABLET ORAL DAILY
Qty: 90 TABLET | Refills: 0 | Status: SHIPPED | OUTPATIENT
Start: 2021-01-12 | End: 2021-04-12

## 2021-01-12 RX ORDER — AMLODIPINE BESYLATE 5 MG/1
TABLET ORAL
Qty: 90 TABLET | Refills: 0 | Status: SHIPPED | OUTPATIENT
Start: 2021-01-12 | End: 2021-04-12

## 2021-01-12 NOTE — PROGRESS NOTES
Ania is a 89 year old who is being evaluated via a billable video visit.      How would you like to obtain your AVS? MyChart  If the video visit is dropped, the invitation should be resent by: Text to cell phone: [phone]  Will anyone else be joining your video visit? Ania & daughter in law    Video Start Time: 11:07 AM    Assessment & Plan     Benign essential hypertension  Good control. Continue current medications  - potassium chloride ER (KLOR-CON M) 20 MEQ CR tablet; TAKE 1 TABLET(20 MEQ) BY MOUTH DAILY.  - hydrochlorothiazide (HYDRODIURIL) 25 MG tablet; Take 1 tablet (25 mg) by mouth daily  - amLODIPine (NORVASC) 5 MG tablet; TAKE 1 TABLET(5 MG) BY MOUTH DAILY    Acquired hypothyroidism  Stable wt. No new sx's. Labs when able.     Gait instability  One fall but o/w doing well. Declines PHYSICAL THERAPY for balance/gait for now but consider in future.     Dementia without behavioral disturbance, unspecified dementia type (H)  Stable, very involved family and good support. Not needed further resources at this time but monitor and update me if needed.     Family will bring her in for lab visit (likely after 2nd vaccine for COVID-19- might be March) or will try to get labs done at her assisted living if available (her daughter in law will mychart if this is possible).          Return in about 6 months (around 7/12/2021) for med check.    Juliette Ordaz MD  [facility]     Ania is a 89 year old who presents to clinic today for the following health issues     HPI     Pt needs refills of her medications. Not able to get refills until she has visit with the provider.     Daughter-in-law reports she is doing remarkably well.   Patient has no concerns  Scheduled to get COVID-19 vaccine 1/23/21    One fall this last year- no injury, wears alert necklace    Does not feel she needs any other resources/support than what is already set up.   Family brings her food, she  heats things in microwave or has food offered where she lives       Review of Systems   Constitutional, HEENT, cardiovascular, pulmonary, gi and gu systems are negative, except as otherwise noted.      Objective           Vitals:  No vitals were obtained today due to virtual visit.    Physical Exam   GENERAL: Healthy, alert and no distress  EYES: Eyes grossly normal to inspection.  No discharge or erythema, or obvious scleral/conjunctival abnormalities.  RESP: No audible wheeze, cough, or visible cyanosis.  No visible retractions or increased work of breathing.    SKIN: Visible skin clear. No significant rash, abnormal pigmentation or lesions.  NEURO: Cranial nerves grossly intact.  Mentation and speech appropriate for age.  PSYCH: Mentation appears normal, affect normal/bright, judgement and insight intact, normal speech and appearance well-groomed.            Video-Visit Details    Type of service:  Video Visit    Video End Time:11:20 AM    Originating Location (pt. Location): Home    Distant Location (provider location):  [facility]     Platform used for Video Visit: IMT ([employer])

## 2021-01-12 NOTE — TELEPHONE ENCOUNTER
See [employer] message. Please fax lab orders placed 1/12/2021 to patient's assisted living   Please have them fax results to me at [place].

## 2021-01-13 ENCOUNTER — TRANSFERRED RECORDS (OUTPATIENT)
Dept: HEALTH INFORMATION MANAGEMENT | Facility: CLINIC | Age: 86
End: 2021-01-13

## 2021-01-13 LAB
CREAT SERPL-MCNC: 0.61 MG/DL (ref 0.55–1.02)
GFR SERPL CREATININE-BSD FRML MDRD: >60 ML/MIN/1.73M2
GLUCOSE SERPL-MCNC: 93 MG/DL (ref 70–100)
POTASSIUM SERPL-SCNC: 3.7 MMOL/L (ref 3.5–5.1)
TSH SERPL-ACNC: 4.12 UIU/ML (ref 0.3–4.5)

## 2021-01-15 ENCOUNTER — HEALTH MAINTENANCE LETTER (OUTPATIENT)
Age: 86
End: 2021-01-15

## 2021-02-22 DIAGNOSIS — E03.9 ACQUIRED HYPOTHYROIDISM: ICD-10-CM

## 2021-02-23 RX ORDER — THYROID 60 MG
TABLET ORAL
Qty: 90 TABLET | Refills: 3 | Status: SHIPPED | OUTPATIENT
Start: 2021-02-23 | End: 2021-10-20

## 2021-02-23 RX ORDER — THYROID,PORK 15 MG
TABLET ORAL
Qty: 90 TABLET | Refills: 3 | Status: SHIPPED | OUTPATIENT
Start: 2021-02-23 | End: 2021-10-20

## 2021-04-10 DIAGNOSIS — I10 BENIGN ESSENTIAL HYPERTENSION: ICD-10-CM

## 2021-04-12 RX ORDER — HYDROCHLOROTHIAZIDE 25 MG/1
TABLET ORAL
Qty: 90 TABLET | Refills: 3 | Status: SHIPPED | OUTPATIENT
Start: 2021-04-12 | End: 2022-01-01

## 2021-04-12 RX ORDER — AMLODIPINE BESYLATE 5 MG/1
TABLET ORAL
Qty: 90 TABLET | Refills: 3 | Status: SHIPPED | OUTPATIENT
Start: 2021-04-12 | End: 2022-01-01

## 2021-04-12 NOTE — TELEPHONE ENCOUNTER
Routing refill request to provider for review/approval because:  Labs not current:  cr  BP Readings from Last 3 Encounters:   11/21/19 (!) 166/69   05/22/19 140/62   10/10/18 187/75     Anais TIANN, RN

## 2021-07-12 ENCOUNTER — OFFICE VISIT (OUTPATIENT)
Dept: FAMILY MEDICINE | Facility: CLINIC | Age: 86
End: 2021-07-12
Payer: MEDICARE

## 2021-07-12 VITALS
OXYGEN SATURATION: 94 % | TEMPERATURE: 98.4 F | BODY MASS INDEX: 30.15 KG/M2 | WEIGHT: 143 LBS | RESPIRATION RATE: 16 BRPM | HEART RATE: 77 BPM | SYSTOLIC BLOOD PRESSURE: 146 MMHG | DIASTOLIC BLOOD PRESSURE: 84 MMHG

## 2021-07-12 DIAGNOSIS — E03.9 ACQUIRED HYPOTHYROIDISM: ICD-10-CM

## 2021-07-12 DIAGNOSIS — I10 HYPERTENSION GOAL BP (BLOOD PRESSURE) < 150/90: ICD-10-CM

## 2021-07-12 DIAGNOSIS — R26.81 GAIT INSTABILITY: ICD-10-CM

## 2021-07-12 DIAGNOSIS — F03.90 DEMENTIA WITHOUT BEHAVIORAL DISTURBANCE, UNSPECIFIED DEMENTIA TYPE: ICD-10-CM

## 2021-07-12 DIAGNOSIS — I10 BENIGN ESSENTIAL HYPERTENSION: Primary | ICD-10-CM

## 2021-07-12 PROCEDURE — 84439 ASSAY OF FREE THYROXINE: CPT | Performed by: FAMILY MEDICINE

## 2021-07-12 PROCEDURE — 36415 COLL VENOUS BLD VENIPUNCTURE: CPT | Performed by: FAMILY MEDICINE

## 2021-07-12 PROCEDURE — 80048 BASIC METABOLIC PNL TOTAL CA: CPT | Performed by: FAMILY MEDICINE

## 2021-07-12 PROCEDURE — 84443 ASSAY THYROID STIM HORMONE: CPT | Performed by: FAMILY MEDICINE

## 2021-07-12 PROCEDURE — 90471 IMMUNIZATION ADMIN: CPT | Performed by: FAMILY MEDICINE

## 2021-07-12 PROCEDURE — 90714 TD VACC NO PRESV 7 YRS+ IM: CPT | Performed by: FAMILY MEDICINE

## 2021-07-12 PROCEDURE — 99214 OFFICE O/P EST MOD 30 MIN: CPT | Mod: 25 | Performed by: FAMILY MEDICINE

## 2021-07-12 PROCEDURE — 84480 ASSAY TRIIODOTHYRONINE (T3): CPT | Performed by: FAMILY MEDICINE

## 2021-07-12 RX ORDER — PROPRANOLOL HYDROCHLORIDE 80 MG/1
TABLET ORAL
Qty: 180 TABLET | Refills: 3 | Status: SHIPPED | OUTPATIENT
Start: 2021-07-12 | End: 2022-01-01

## 2021-07-12 NOTE — NURSING NOTE
Prior to immunization administration, verified patients identity using patient s name and date of birth. Please see Immunization Activity for additional information.     Screening Questionnaire for Adult Immunization    Are you sick today?   No   Do you have allergies to medications, food, a vaccine component or latex?   Yes   Have you ever had a serious reaction after receiving a vaccination?   No   Do you have a long-term health problem with heart, lung, kidney, or metabolic disease (e.g., diabetes), asthma, a blood disorder, no spleen, complement component deficiency, a cochlear implant, or a spinal fluid leak?  Are you on long-term aspirin therapy?   No   Do you have cancer, leukemia, HIV/AIDS, or any other immune system problem?   No   Do you have a parent, brother, or sister with an immune system problem?   No   In the past 3 months, have you taken medications that affect  your immune system, such as prednisone, other steroids, or anticancer drugs; drugs for the treatment of rheumatoid arthritis, Crohn s disease, or psoriasis; or have you had radiation treatments?   No   Have you had a seizure, or a brain or other nervous system problem?   No   During the past year, have you received a transfusion of blood or blood    products, or been given immune (gamma) globulin or antiviral drug?   No   For women: Are you pregnant or is there a chance you could become       pregnant during the next month?   No   Have you received any vaccinations in the past 4 weeks?   No     Immunization questionnaire answers were all negative.        Per orders of Dr. Ordaz, injection of TD given by Nena Jiménez. Patient instructed to remain in clinic for 15 minutes afterwards, and to report any adverse reaction to me immediately.       Screening performed by Nena Jiménez on 7/12/2021 at 6:19 PM.

## 2021-07-12 NOTE — PATIENT INSTRUCTIONS
I encourage you to consider the vaccine to prevent shingles.  (SHINGRIX)  This is two vaccines 2-6 months apart.   Check with your insurance company regarding coverage.  If you are on Medicare you should get this vaccine at the pharmacy

## 2021-07-12 NOTE — PROGRESS NOTES
Assessment & Plan     Benign essential hypertension  Fairly well-controlled today.  There is some question of med compliance based on refills the daughter-in-law is doing but she has been resistant to any changes in her current system for medications.  We discussed home health care but she declines this.  She is willing to have care coordination reach out to her and her son and at least this could be a good starting point for them.  - Care Coordination Referral; Future  - Basic metabolic panel  (Ca, Cl, CO2, Creat, Gluc, K, Na, BUN); Future  - Basic metabolic panel  (Ca, Cl, CO2, Creat, Gluc, K, Na, BUN)    Acquired hypothyroidism  Some mild increased fatigue.  We will titrate her thyroid doses up if able based on today's results  - Care Coordination Referral; Future  - TSH with free T4 reflex; Future  - T3, total; Future  - T3, total  - TSH with free T4 reflex    Dementia without behavioral disturbance, unspecified dementia type (H)  As above.  Would likely benefit from med machine to help with compliance but she is so far fairly against this.  I did discuss with her allowing some minor changes to help with this would hopefully help her to continue to be as independent as she has been  - Care Coordination Referral; Future    Gait instability  Reviewed need for cane and walker to help with stability.  Her family continues to work on this with her    Hypertension goal BP (blood pressure) < 150/90  At goal, continue current medication  - propranolol (INDERAL) 80 MG tablet; TAKE 1 TABLET(80 MG) BY MOUTH TWICE DAILY      Patient Instructions   I encourage you to consider the vaccine to prevent shingles.  (SHINGRIX)  This is two vaccines 2-6 months apart.   Check with your insurance company regarding coverage.  If you are on Medicare you should get this vaccine at the pharmacy                Return in about 6 months (around 1/12/2022) for med check, using a video visit.    Juliette Ordaz MD   HEALTH  "[facility] OTONIEL Jorge is a 89 year old who presents for the following health issues  accompanied by her daughter in law:    HPI     Hypertension Follow-up      Do you check your blood pressure regularly outside of the clinic? Yes     Are you following a low salt diet? Yes    Are your blood pressures ever more than 140 on the top number (systolic) OR more   than 90 on the bottom number (diastolic), for example 140/90? Yes 140-150/90    Hypothyroidism Follow-up1      Since last visit, patient describes the following symptoms: fatigue      How many servings of fruits and vegetables do you eat daily?  1-3    On average, how many sweetened beverages do you drink each day (Examples: soda, juice, sweet tea, etc.  Do NOT count diet or artificially sweetened beverages)?   0    How many days per week do you exercise enough to make your heart beat faster? 3 or less    How many minutes a day do you exercise enough to make your heart beat faster? 9 or less    How many days per week do you miss taking your medication? Has become more forgetful about taking meds      Frozen dinners primarily. Never uses oven and rarely uses stove.   Can also eat meals provided by her senior living facility    Fell x 1 this last year. Generally very cautious and moves slowly.   Family trying to talk her into taking some things out of her apt to make it safer. She \"gets kind of fiesty\" with these suggestions.   Has cane and walker but doesn't use them due to lack of space in the apartment to get around    Son is helping more with bills and paper work more.     Review of Systems   Constitutional, HEENT, cardiovascular, pulmonary, gi and gu systems are negative, except as otherwise noted.      Objective    BP (!) 146/84   Pulse 77   Temp 98.4  F (36.9  C) (Tympanic)   Resp 16   Wt 64.9 kg (143 lb)   SpO2 94%   BMI 30.15 kg/m    Body mass index is 30.15 kg/m .  Physical Exam   GENERAL: healthy, alert and no " distress  NECK: no adenopathy, no asymmetry, masses, or scars and thyroid normal to palpation  RESP: lungs clear to auscultation - no rales, rhonchi or wheezes  CV: regular rate and rhythm, normal S1 S2, no S3 or S4, no murmur, click or rub, no peripheral edema and peripheral pulses strong  MS: no gross musculoskeletal defects noted, no edema  PSYCH: affect normal/bright, appearance well groomed and insight into her medication management is somewhat limited.  Still a fairly good historian although does remind her daughter-in-law for many answers

## 2021-07-12 NOTE — LETTER
July 14, 2021      Ania TILLMAN Diego  [address]  [place] [address]        Dear ,    We are writing to inform you of your test results.    It was a pleasure to see you in the office recently.   - your thyroid test indicates we can increase your thyroid medication a little bit. I would recommend increasing the dose to 90 mg daily. This may help your fatigue somewhat. I will send in a new prescription for the 90 mg dose (you will only need to take one pill per day of the new Rx. You can stop the 60 mg and the 15 mg doses). Plan to recheck the thyroid test in 2-3 months to see if we made the correct adjustment in dose. A lab only visit is fine for this.   - the kidney and electrolyte panel looks good.   Please MyChart or call if you have any concerns or questions.     If you have any questions or concerns, please call the clinic at the number listed above.       Sincerely,      Juliette Ordaz MD        Resulted Orders   T3, total   Result Value Ref Range    T3 Total 91 60 - 181 ng/dL   TSH with free T4 reflex   Result Value Ref Range    TSH 4.11 (H) 0.40 - 4.00 mU/L   Basic metabolic panel  (Ca, Cl, CO2, Creat, Gluc, K, Na, BUN)   Result Value Ref Range    Sodium 141 133 - 144 mmol/L    Potassium 4.8 3.4 - 5.3 mmol/L    Chloride 106 mmol/L    Carbon Dioxide (CO2) 32 20 - 32 mmol/L    Anion Gap 3 3 - 14 mmol/L    Urea Nitrogen 21 7 - 30 mg/dL    Creatinine 0.76 mg/dL    Calcium 9.3 8.5 - 10.1 mg/dL    Glucose 109 (H) 70 - 99 mg/dL    GFR Estimate 67 >60 mL/min/1.73m2      Comment:      As of July 11, 2021, eGFR is calculated by the CKD-EPI creatinine equation, without race adjustment. eGFR can be influenced by muscle mass, exercise, and diet. The reported eGFR is an estimation only and is only applicable if the renal function is stable.   T4 free   Result Value Ref Range    Free T4 0.77 0.76 - 1.46 ng/dL

## 2021-07-13 ENCOUNTER — PATIENT OUTREACH (OUTPATIENT)
Dept: CARE COORDINATION | Facility: CLINIC | Age: 86
End: 2021-07-13

## 2021-07-13 LAB
ANION GAP SERPL CALCULATED.3IONS-SCNC: 3 MMOL/L (ref 3–14)
BUN SERPL-MCNC: 21 MG/DL (ref 7–30)
CALCIUM SERPL-MCNC: 9.3 MG/DL (ref 8.5–10.1)
CHLORIDE BLD-SCNC: 106 MMOL/L
CO2 SERPL-SCNC: 32 MMOL/L (ref 20–32)
CREAT SERPL-MCNC: 0.76 MG/DL
GFR SERPL CREATININE-BSD FRML MDRD: 67 ML/MIN/1.73M2
GLUCOSE BLD-MCNC: 109 MG/DL (ref 70–99)
POTASSIUM BLD-SCNC: 4.8 MMOL/L (ref 3.4–5.3)
SODIUM SERPL-SCNC: 141 MMOL/L (ref 133–144)
T4 FREE SERPL-MCNC: 0.77 NG/DL (ref 0.76–1.46)
TSH SERPL DL<=0.005 MIU/L-ACNC: 4.11 MU/L (ref 0.4–4)

## 2021-07-13 NOTE — PROGRESS NOTES
Clinic Care Coordination Contact  [facility]/Voicemail       Clinical Data: CHW Outreach  Outreach attempted x 1. Left message on patient's voicemail with call back information and requested return call.    Plan: CHW will try to reach patient again in 1-2 business days.    Mercy ARZOLA Community Health Worker  Clinic Care Coordination  [facility] Clinics : [place], [place] & [place]  Phone: [phone]    Reason for Referral- Caregiver support    Notes:  Referred by PCP  Schedule with CC SW

## 2021-07-14 DIAGNOSIS — E03.9 ACQUIRED HYPOTHYROIDISM: Primary | ICD-10-CM

## 2021-07-14 LAB — T3 SERPL-MCNC: 91 NG/DL (ref 60–181)

## 2021-07-14 RX ORDER — THYROID 90 MG/1
90 TABLET ORAL DAILY
Qty: 90 TABLET | Refills: 0 | Status: SHIPPED | OUTPATIENT
Start: 2021-07-14 | End: 2021-10-20

## 2021-07-14 NOTE — RESULT ENCOUNTER NOTE
Please include results and send lab letter to patient:    Ania,  It was a pleasure to see you in the office recently.   - your thyroid test indicates we can increase your thyroid medication a little bit. I would recommend increasing the dose to 90 mg daily. This may help your fatigue somewhat. I will send in a new prescription for the 90 mg dose (you will only need to take one pill per day of the new Rx. You can stop the 60 mg and the 15 mg doses). Plan to recheck the thyroid test in 2-3 months to see if we made the correct adjustment in dose. A lab only visit is fine for this.   - the kidney and electrolyte panel looks good.   Please MyChart or call if you have any concerns or questions.   Sincerely,  Juliette Ordaz MD

## 2021-07-14 NOTE — PROGRESS NOTES
CHW received a message from patients daughter in law Guillermo stating they will be out of town starting today and will return on 07/21/21. She was asking for CHW to call her on 07/22/21 to discuss resource for patient.    CHW will call patient daughter in law ion 07/22/21    Mercy ARZOLA Community Health Worker  Clinic Care Coordination  [facility] Clinics : [place], [place] & [place]  Phone: [phone]    Notes:  Referred by PCP  Schedule with CC SW

## 2021-07-15 ENCOUNTER — TELEPHONE (OUTPATIENT)
Dept: FAMILY MEDICINE | Facility: CLINIC | Age: 86
End: 2021-07-15

## 2021-07-15 NOTE — TELEPHONE ENCOUNTER
PA for thyroid (ARMOUR) 90 MG tablet    Plan does not cover this medication    Phone# [phone]    ID# [id number]    PA or alternative    Jocy Goodwin

## 2021-07-15 NOTE — TELEPHONE ENCOUNTER
Central Prior Authorization Team   Phone: [phone]      PA NOT NEEDED    Medication: thyroid (ARMOUR) 90 MG tablet-PA NOT NEEDED  Insurance Company:    Pharmacy Filling the Rx: [id number] [address] AT [facility]  Filling Pharmacy Phone: [phone]  Filling Pharmacy Fax:    Start Date: 7/15/2021    Called pharmacy to get patient's 3rd party billing information since all we have is a supplement plan.  Per pharmacy, PA can be disregarded.  Patient only has the supplement plan on file and they were able to process medication through Single [facility] which is how the patient has been getting the medication.

## 2021-07-20 DIAGNOSIS — I10 HYPERTENSION GOAL BP (BLOOD PRESSURE) < 150/90: ICD-10-CM

## 2021-07-20 RX ORDER — PROPRANOLOL HYDROCHLORIDE 80 MG/1
TABLET ORAL
Qty: 180 TABLET | Refills: 3 | OUTPATIENT
Start: 2021-07-20

## 2021-07-20 NOTE — TELEPHONE ENCOUNTER
Duplicate refill request for propranolol 80 mg tablets. This prescription was refilled on 7/12/21. A year supply of refills was sent to patient's preferred pharmacy on file.       Nan Rich RN  [facility]

## 2021-07-22 ENCOUNTER — PATIENT OUTREACH (OUTPATIENT)
Dept: NURSING | Facility: CLINIC | Age: 86
End: 2021-07-22
Payer: MEDICARE

## 2021-07-22 ENCOUNTER — MYC MEDICAL ADVICE (OUTPATIENT)
Dept: FAMILY MEDICINE | Facility: CLINIC | Age: 86
End: 2021-07-22

## 2021-07-22 NOTE — PROGRESS NOTES
Clinic Care Coordination Contact  Community Health Worker Initial Outreach    CHW Initial Information Gathering:  Preferred Hospital: [facility]  [phone]  Preferred Urgent Care:  (None)  Current living arrangement:: I live in assisted living  Type of residence:: Apartment  Community Resources: None  Supplies used at home:: None  Equipment Currently Used at Home: grab bar, tub/shower  Transportation means:: Family  CHW Additional Questions  If ED/Hospital discharge, follow-up appointment scheduled as recommended?: N/A  Medication changes made following ED/Hospital discharge?: N/A  MyChart active?: Yes  Patient sent Social Determinants of Health questionnaire?: Yes    Patient accepts CC: Yes. Patient scheduled for assessment with CC SW on 07/27/21 at 9:30 am . Patient noted desire to discuss CHW spoke with patients daughter in law Guillermo. Family is worried about patients safety she is forgetting to take medication and has some safety issues in home. She will not let family set up medocations or clena home. They are wondering if someone can come in and speak with patient and explain why she needs help with medications and clear house..     Mercy ARZOLA Community Health Worker  Clinic Care Coordination  [facility] Clinics : [place], [place] & [place]  Phone: [phone]    Resources for Support

## 2021-07-23 NOTE — TELEPHONE ENCOUNTER
Please see phone note on 7/15/21. Please call pharmacy and clarify if PA needed as it doesn't seem this is the case. Update patient with progress once known

## 2021-07-23 NOTE — TELEPHONE ENCOUNTER
Team to watch for PA form   <<----- Click to add NO pertinent Past Medical History No pertinent past medical history

## 2021-07-27 ENCOUNTER — PATIENT OUTREACH (OUTPATIENT)
Dept: NURSING | Facility: CLINIC | Age: 86
End: 2021-07-27
Payer: MEDICARE

## 2021-07-27 DIAGNOSIS — F03.90 DEMENTIA WITHOUT BEHAVIORAL DISTURBANCE, UNSPECIFIED DEMENTIA TYPE: ICD-10-CM

## 2021-07-27 DIAGNOSIS — I10 BENIGN ESSENTIAL HYPERTENSION: ICD-10-CM

## 2021-07-27 DIAGNOSIS — E03.9 ACQUIRED HYPOTHYROIDISM: ICD-10-CM

## 2021-07-27 DIAGNOSIS — R26.81 GAIT INSTABILITY: Primary | ICD-10-CM

## 2021-07-27 SDOH — ECONOMIC STABILITY: TRANSPORTATION INSECURITY
IN THE PAST 12 MONTHS, HAS THE LACK OF TRANSPORTATION KEPT YOU FROM MEDICAL APPOINTMENTS OR FROM GETTING MEDICATIONS?: NO

## 2021-07-27 SDOH — ECONOMIC STABILITY: FOOD INSECURITY: WITHIN THE PAST 12 MONTHS, YOU WORRIED THAT YOUR FOOD WOULD RUN OUT BEFORE YOU GOT MONEY TO BUY MORE.: NEVER TRUE

## 2021-07-27 SDOH — ECONOMIC STABILITY: FOOD INSECURITY: WITHIN THE PAST 12 MONTHS, THE FOOD YOU BOUGHT JUST DIDN'T LAST AND YOU DIDN'T HAVE MONEY TO GET MORE.: NEVER TRUE

## 2021-07-27 SDOH — ECONOMIC STABILITY: TRANSPORTATION INSECURITY
IN THE PAST 12 MONTHS, HAS LACK OF TRANSPORTATION KEPT YOU FROM MEETINGS, WORK, OR FROM GETTING THINGS NEEDED FOR DAILY LIVING?: NO

## 2021-07-27 ASSESSMENT — SOCIAL DETERMINANTS OF HEALTH (SDOH): HOW HARD IS IT FOR YOU TO PAY FOR THE VERY BASICS LIKE FOOD, HOUSING, MEDICAL CARE, AND HEATING?: NOT HARD AT ALL

## 2021-07-27 ASSESSMENT — ACTIVITIES OF DAILY LIVING (ADL): DEPENDENT_IADLS:: INDEPENDENT

## 2021-07-27 NOTE — TELEPHONE ENCOUNTER
Team-     Family requesting a home safety evaluation from home care. If PCP agreeable, please have referral placed.     Thank you!     LINDA Mosquera   Primary Care Clinic- Social Work Care Coordinator   [facility]- [place], [place] and [place]   Ph: [phone]   7/27/2021 10:22 AM

## 2021-07-27 NOTE — LETTER
[facility] CARE COORDINATION  [address]  [place] [address]    July 27, 2021    Anai Cortez  C/O Guillermo Cortez  [address]  [place] [address]      Dear Guillermo,    I am a clinic care coordinator who works with Juliette Ordaz MD at [facility]. I wanted to thank you for spending the time to talk with me.  Below is a description of clinic care coordination and how I can further assist you.      The clinic care coordination team is made up of a registered nurse,  and community health worker who understand the health care system. The goal of clinic care coordination is to help you manage your health and improve access to the health care system in the most efficient manner. The team can assist you in meeting your health care goals by providing education, coordinating services, strengthening the communication among your providers and supporting you with any resource needs.    Please feel free to contact me at [phone] with any questions or concerns. We are focused on providing you with the highest-quality healthcare experience possible and that all starts with you.     Sincerely,     Alex Easton, [facility]  Primary Care Clinic- Social Work Care Coordinator  [facility]- [place], [place] and [place]  Ph: [phone]      Enclosed: I have enclosed a copy of the Complex Care Plan. This has helpful information and goals that we have talked about. Please keep this in an easy to access place to use as needed.

## 2021-07-27 NOTE — LETTER
[facility]  Complex Care Plan  About Me:    Patient Name:  Anai Cortez    YOB: 1926  Age:         89 year old   [facility] MRN:    [id number] Telephone Information:  Home Phone [phone]   Mobile [phone]       Address:  [address]  [place] [address] Email address:  [email or web address]      Emergency Contact(s)    Name Relationship Lgl Grd Work Phone Home Phone Mobile Phone   1. JENNY CORTEZ Son   [phone]   2. CECELIA PORTER Daughter   [phone]   3. ABY CORTEZ Daughter-in-Law   [phone]           Primary language:  English     needed? No   [employer]:  [phone] op. 1  Other communication barriers: Cognitive impairment  Preferred Method of Communication:  Mail  Current living arrangement: I live in assisted living  Mobility Status/ Medical Equipment: Independent    Health Maintenance  Health Maintenance Reviewed: Due/Overdue   Health Maintenance Due   Topic Date Due     ZOSTER IMMUNIZATION (1 of 2) Never done     MEDICARE ANNUAL WELLNESS VISIT  Never done     FALL RISK ASSESSMENT  05/22/2020     ADVANCE CARE PLANNING  09/17/2020         My Access Plan  Medical Emergency 911   Primary Clinic Line [facility] [phone]   24 Hour Appointment Line [phone] or  [phone]) (toll-free)   24 Hour Nurse Line [phone] (toll-free)   Preferred Urgent Care  (None)   Preferred Hospital [facility]  [phone]   Preferred Pharmacy [id number] - [place], [place] - [address] AT [facility]     Behavioral Health Crisis Line The National Suicide Prevention Lifeline at 1-248.367.5637 or 911             My Care Team Members  Patient Care Team       Relationship Specialty Notifications Start End    Juliette Ordaz MD PCP - General Family Practice  7/16/15     Phone:  [phone] Fax: [phone]         [address] [facility] [address]    Juliette Ordaz MD Assigned PCP   5/3/15     Phone: [phone] Fax: [phone] [address] [facility] [address]    Alex Easton BSW Lead Care Coordinator Primary Care - CC Admissions 7/27/21             My Care Plans  Self Management and Treatment Plan  Goals and (Comments)  Goals        General     Medical (pt-stated)      Notes - Note created  7/27/2021 10:07 AM by Alex Easton BSW     Goal Statement: Patient could benefit from a home safety evaluation   Date Goal set: 7/27/2021  Barriers: None  Strengths: Patient's family are great advocates; Patient is enrolled in Care Coordination.  Date to Achieve By: 8/2021  Patient expressed understanding of goal: No- Daughter-in-law  Action steps to achieve this goal:  1. [facility] will request referral for home safety eval from PCP  2. Family will notify patient of home safety eval  3. Patient will complete a home safety eval.          Medical (pt-stated)      Notes - Note created  7/27/2021 10:12 AM by Alex Easton BSW     Goal Statement: Medication management  Date Goal set: 7/27/2021  Barriers: Patient particular about how medications are taken  Strengths: Patient's family are great advocates; Patient is enrolled in Care Coordination  Date to Achieve By: 10/2021  Patient expressed understanding of goal: No- Daughter in law  Action steps to achieve this goal:  1. Family will look in to pill dispenser  2. Patient will purchase a pill dispenser, if agreeable.                  Action Plans on File:                       Advance Care Plans/Directives Type:   Type Advanced Care Plans/Directives: Advanced Directive - On File    My Medical and Care Information  Problem List   Patient Active Problem List   Diagnosis     S/P rotator cuff surgery     Osteoarthritis     Hip pain     Acquired hypothyroidism     Benign essential hypertension     Advanced directives,  counseling/discussion     Dementia (H)     Gait instability      Current Medications and Allergies:  See printed Medication Report.    Care Coordination Start Date: 7/27/2021   Frequency of Care Coordination: monthly   Form Last Updated: 07/27/2021

## 2021-07-27 NOTE — PROGRESS NOTES
Clinic Care Coordination Contact    Clinic Care Coordination Contact  OUTREACH    Referral Information:  Referral Source: Care Team    Reason for Referral: Patient/Caregiver Support    Patient/Caregiver Suport: Resources for Support    Clinical Staff have discussed the Care Coordination Referral with the patient and/or caregiver: Yes    Additional Information: Son- J Carlos (Power of ) and daugther in law noting forgetting meds more often but refusing to make changes in current systems (see OV note 7/12/21)          Primary Diagnosis: Psychosocial    Chief Complaint   Patient presents with     Clinic Care Coordination - Initial     Alex ANAND        D[place] Utilization: [facility] Utilization  Difficulty keeping appointments:: No  Compliance Concerns: No  No-Show Concerns: No  No PCP office visit in Past Year: No  Utilization    Hospital Admissions  0             ED Visits  0             No Show Count (past year)  0                Current as of: 7/27/2021  9:23 AM              Clinical Concerns:  Current Medical Concerns:    Patient Active Problem List   Diagnosis     S/P rotator cuff surgery     Osteoarthritis     Hip pain     Acquired hypothyroidism     Benign essential hypertension     Advanced directives, counseling/discussion     Dementia (H)     Gait instability         Current Behavioral Concerns: Patient has dx of dementia    Education Provided to patient: Introduced self and role to daughter in law. Consent to communicate is on file for her.    Pain  Pain (GOAL):: No  Health Maintenance Reviewed: Due/Overdue   Health Maintenance Due   Topic Date Due     ZOSTER IMMUNIZATION (1 of 2) Never done     MEDICARE ANNUAL WELLNESS VISIT  Never done     FALL RISK ASSESSMENT  05/22/2020     ADVANCE CARE PLANNING  09/17/2020       Clinical Pathway: None    Medication Management:  Did not review medications during this conversation.     Functional Status:  Dependent ADLs:: Independent  Dependent  IADLs:: Independent  Bed or wheelchair confined:: No  Mobility Status: Independent  Fallen 2 or more times in the past year?: Yes  Any fall with injury in the past year?: No    Living Situation:  Current living arrangement:: I live in assisted living  Type of residence:: Apartment    Lifestyle & Psychosocial Needs: SWCC contacted patient's daughter in law, Guillermo, as scheduled. Consent to communicate is on file. Introduced self and role. Discussed consult reason. Guillermo stated that patient lives at [facility], however, she is very independent. She explained that patient is somewhat of a hoarder and there is stuff all over her apartment. Patient may benefit from a home safety evaluation d/t the clutter and the fact that she has fallen in her apartment recently. CC stated that she will notify provider of the request. Daughter in law also states that patient has her own way of doing things, which includes how her medications are taken. Daughter in law feels that patient may not take her medications correctly sometimes. CC asked if patient would be agreeable to a pill dispenser machine. Daughter in law thought it may be a good idea for patient and family will bring it up to her.     Social Determinants of Health     Tobacco Use: Low Risk      Smoking Tobacco Use: Never Smoker     Smokeless Tobacco Use: Never Used   Alcohol Use:      Frequency of Alcohol Consumption:      Average Number of Drinks:      Frequency of Binge Drinking:    Financial Resource Strain: Low Risk      Difficulty of Paying Living Expenses: Not hard at all   Food Insecurity: No Food Insecurity     Worried About Running Out of Food in the Last Year: Never true     Ran Out of Food in the Last Year: Never true   Transportation Needs: No Transportation Needs     Lack of Transportation (Medical): No     Lack of Transportation (Non-Medical): No   Physical Activity:      Days of Exercise per Week:      Minutes of Exercise per Session:    Stress:       Feeling of Stress :    Social Connections:      Frequency of Communication with Friends and Family:      Frequency of Social Gatherings with Friends and Family:      Attends Confucianist Services:      Active Member of Clubs or Organizations:      Attends Club or Organization Meetings:      Marital Status:    Intimate Partner Violence:      Fear of Current or Ex-Partner:      Emotionally Abused:      Physically Abused:      Sexually Abused:    Depression: Not at risk     PHQ-2 Score: 0   Housing Stability: Low Risk      Unable to Pay for Housing in the Last Year: No     Number of Places Lived in the Last Year: 1     Unstable Housing in the Last Year: No     Diet:: Regular  Inadequate nutrition (GOAL):: No  Tube Feeding: No  Inadequate activity/exercise (GOAL):: No  Significant changes in sleep pattern (GOAL): No  Transportation means:: Family     Confucianist or spiritual beliefs that impact treatment:: No  Mental health DX:: No  Mental health management concern (GOAL):: No  Chemical Dependency Status: No Current Concerns  Informal Support system:: Children, Family      Resources and Interventions:  Current Resources:      Community Resources: None  Supplies used at home:: None  Equipment Currently Used at Home: grab bar, tub/shower  Employment Status: retired         Advance Care Plan/Directive  Advanced Care Plans/Directives on file:: Yes  Type Advanced Care Plans/Directives: Advanced Directive - On File  Advanced Care Plan/Directive Status: Not Applicable    Referrals Placed: None     Goals:   Goals        General     Medical (pt-stated)      Notes - Note created  7/27/2021 10:07 AM by Alex Easton BSW     Goal Statement: Patient could benefit from a home safety evaluation   Date Goal set: 7/27/2021  Barriers: None  Strengths: Patient's family are great advocates; Patient is enrolled in Care Coordination.  Date to Achieve By: 8/2021  Patient expressed understanding of goal: No- Daughter-in-law  Action steps to  achieve this goal:  1. [facility] will request referral for home safety eval from PCP  2. Family will notify patient of home safety eval  3. Patient will complete a home safety eval.          Medical (pt-stated)      Notes - Note created  7/27/2021 10:12 AM by Alex Easton BSW     Goal Statement: Medication management  Date Goal set: 7/27/2021  Barriers: Patient particular about how medications are taken  Strengths: Patient's family are great advocates; Patient is enrolled in Care Coordination  Date to Achieve By: 10/2021  Patient expressed understanding of goal: No- Daughter in law  Action steps to achieve this goal:  1. Family will look in to pill dispenser  2. Patient will purchase a pill dispenser, if agreeable.                 Patient/Caregiver understanding: Daughter in law    Outreach Frequency: monthly      Plan: Patient was enrolled in Care Coordination. Family will discuss home safety evaluation and pill dispenser with patient. [facility] will send notification to provider to order home safety eval, if appropriate. [facility] will send intro letter and complex care plan [facility]. Daughter in law has access to this. Next outreach will be in one month    LINDA Mosquera  Primary Care Clinic- Social Work Care Coordinator  [facility]  Ph: [phone]  7/27/2021 10:21 AM

## 2021-07-29 ENCOUNTER — TELEPHONE (OUTPATIENT)
Dept: FAMILY MEDICINE | Facility: CLINIC | Age: 86
End: 2021-07-29

## 2021-07-29 NOTE — TELEPHONE ENCOUNTER
[facility], called to let you know they are  delayed in getting out to home, unable to reach patient

## 2021-07-30 ENCOUNTER — MEDICAL CORRESPONDENCE (OUTPATIENT)
Dept: HEALTH INFORMATION MANAGEMENT | Facility: CLINIC | Age: 86
End: 2021-07-30

## 2021-08-01 ENCOUNTER — TELEPHONE (OUTPATIENT)
Dept: FAMILY MEDICINE | Facility: CLINIC | Age: 86
End: 2021-08-01

## 2021-08-01 NOTE — TELEPHONE ENCOUNTER
Received voicemail from Lukas  occupational therapy with [facility].    He requested several things as noted below:  -Requests start of PT care the August 8  -Request OT verbal orders for 1 time a week for 3 weeks  -Noted patient has declined home health aide  -Requested we clarify her dosing of acetaminophen  -Noted blood pressure has been elevated, need parameters        Please call Lukas back at [phone]:  Okay for orders requested above  Max dosing for acetaminophen is 3000 mg per 24 hours  Blood pressure parameters: Goal is less than 150/95

## 2021-08-03 NOTE — TELEPHONE ENCOUNTER
Called Lukas back and LVM letting him know that Dr. Ordaz approves of the requested orders, and instructions on tylenol and BP goal.

## 2021-08-04 ENCOUNTER — TELEPHONE (OUTPATIENT)
Dept: FAMILY MEDICINE | Facility: CLINIC | Age: 86
End: 2021-08-04

## 2021-08-04 DIAGNOSIS — Z53.9 DIAGNOSIS NOT YET DEFINED: Primary | ICD-10-CM

## 2021-08-04 PROCEDURE — G0180 MD CERTIFICATION HHA PATIENT: HCPCS | Performed by: FAMILY MEDICINE

## 2021-08-05 ENCOUNTER — DOCUMENTATION ONLY (OUTPATIENT)
Dept: CARE COORDINATION | Facility: CLINIC | Age: 86
End: 2021-08-05

## 2021-08-05 NOTE — PROGRESS NOTES
"Called and spoke with Guillermo, daughter in law (auth to discuss PHI on file).  She stated that patient does her own meds, \"just misses a dose sometime\"  Guillermo is a nurse as well.  She stated that patient will be fine to wait until the week of 8/9/21    Ruben Keita RN  [facility]    "
[facility] now requests orders and shares plan of care/discharge summaries for some patients through [facility].  Please REPLY TO THIS MESSAGE OR ROUTE BACK TO THE AUTHOR in order to give authorization for orders when needed.  This is considered a verbal order, you will still receive a faxed copy of orders for signature.  Thank you for your assistance in improving collaboration for our patients.    FYI   Patient requesting to change visit for RN eval to week of 8/9/21    Jazmine Sharma RN  [phone]  
Okay to change eval per below as long as patient is safe and does not need the RN eval this week. Staff please call patient to confirm she feels safe to push the eval to next week.    Thank you,  LUCY Victoria, NP-C  [facility]    
ambulatory

## 2021-08-12 ENCOUNTER — DOCUMENTATION ONLY (OUTPATIENT)
Dept: CARE COORDINATION | Facility: CLINIC | Age: 86
End: 2021-08-12

## 2021-08-12 NOTE — PROGRESS NOTES
[facility] now requests orders and shares plan of care/discharge summaries for some patients through [employer].  Please REPLY TO THIS MESSAGE OR ROUTE BACK TO THE AUTHOR in order to give authorization for orders when needed.  This is considered a verbal order, you will still receive a faxed copy of orders for signature.  Thank you for your assistance in improving collaboration for our patients.    FYI only: Patient SN yoo moved to next week    Jazmine Sharma RN [phone]

## 2021-08-18 ENCOUNTER — MEDICAL CORRESPONDENCE (OUTPATIENT)
Dept: HEALTH INFORMATION MANAGEMENT | Facility: CLINIC | Age: 86
End: 2021-08-18

## 2021-08-18 ENCOUNTER — TELEPHONE (OUTPATIENT)
Dept: FAMILY MEDICINE | Facility: CLINIC | Age: 86
End: 2021-08-18

## 2021-08-27 ENCOUNTER — PATIENT OUTREACH (OUTPATIENT)
Dept: NURSING | Facility: CLINIC | Age: 86
End: 2021-08-27
Payer: MEDICARE

## 2021-08-27 NOTE — PROGRESS NOTES
Clinic Care Coordination Contact    Follow Up Progress Note      Assessment: CC contacted patient's daughter in law, Guillermo, for follow up. She stated that they were able to have home care come in once, but they noticed that it was all to overwhelming for patient so home care is not coming out any longer. CC asked about pill dispenser and if that discussion has taken place yet. Daughter in law stated that they will have that discussion with patient when they return from their vacation in the beginning of September. No further questions at this time.     Care Gaps:    Health Maintenance Due   Topic Date Due     ZOSTER IMMUNIZATION (1 of 2) Never done     MEDICARE ANNUAL WELLNESS VISIT  Never done     ADVANCE CARE PLANNING  09/17/2020     INFLUENZA VACCINE (1) 09/01/2021           Goals addressed this encounter:   Goals Addressed                    This Visit's Progress       Patient Stated       Medical (pt-stated)   100%      Goal Statement: Patient could benefit from a home safety evaluation   Date Goal set: 7/27/2021  Barriers: None  Strengths: Patient's family are great advocates; Patient is enrolled in Care Coordination.  Date to Achieve By: 8/2021  Patient expressed understanding of goal: No- Daughter-in-law  Action steps to achieve this goal:  1. CC will request referral for home safety eval from PCP  2. Family will notify patient of home safety eval  3. Patient will complete a home safety eval.            Medical (pt-stated)   0%      Goal Statement: Medication management  Date Goal set: 7/27/2021  Barriers: Patient particular about how medications are taken  Strengths: Patient's family are great advocates; Patient is enrolled in Care Coordination  Date to Achieve By: 10/2021  Patient expressed understanding of goal: No- Daughter in law  Action steps to achieve this goal:  1. Family will look in to pill dispenser  2. Patient will purchase a pill dispenser, if agreeable.                  Intervention/Education provided during outreach: Open ended questions     Outreach Frequency: monthly    Plan:   Care Coordinator will follow up in 1 month    LINDA Mosquera  Primary Care Clinic- Social Work Care Coordinator  [facility] [place] [place] [facility] [place] [facility]  Ph: [phone]  8/27/2021 1:56 PM

## 2021-09-04 ENCOUNTER — HEALTH MAINTENANCE LETTER (OUTPATIENT)
Age: 86
End: 2021-09-04

## 2021-09-28 ENCOUNTER — PATIENT OUTREACH (OUTPATIENT)
Dept: CARE COORDINATION | Facility: CLINIC | Age: 86
End: 2021-09-28

## 2021-09-28 NOTE — PROGRESS NOTES
Clinic Care Coordination Contact  [facility]/Voicemail       Clinical Data: Care Coordinator Outreach  Outreach attempted x 1.  Left message on daughter in law, Guillermo's voicemail with call back information and requested return call.  Plan: Care Coordinator will try to reach patient again in 2 weeks.    ILNDA Mosquera  Primary Care Clinic- Social Work Care Coordinator  [facility]  Ph: [phone]  9/28/2021 1:09 PM

## 2021-10-11 ENCOUNTER — MYC MEDICAL ADVICE (OUTPATIENT)
Dept: FAMILY MEDICINE | Facility: CLINIC | Age: 86
End: 2021-10-11

## 2021-10-11 NOTE — TELEPHONE ENCOUNTER
Juliette Ordaz MD   7/14/2021  9:25 AM CDT         Please include results and send lab letter to patient:     Ania,  It was a pleasure to see you in the office recently.   - your thyroid test indicates we can increase your thyroid medication a little bit. I would recommend increasing the dose to 90 mg daily. This may help your fatigue somewhat. I will send in a new prescription for the 90 mg dose (you will only need to take one pill per day of the new Rx. You can stop the 60 mg and the 15 mg doses). Plan to recheck the thyroid test in 2-3 months to see if we made the correct adjustment in dose. A lab only visit is fine for this.   - the kidney and electrolyte panel looks good.   Please MyChart or call if you have any concerns or questions.   Sincerely,  Juliette Ordaz MD

## 2021-10-12 ENCOUNTER — PATIENT OUTREACH (OUTPATIENT)
Dept: NURSING | Facility: CLINIC | Age: 86
End: 2021-10-12
Payer: MEDICARE

## 2021-10-12 NOTE — PROGRESS NOTES
Clinic Care Coordination Contact    Follow Up Progress Note      Assessment: [facility] contacted patient's daughter-in-law, Guillermo. Guillermo stated that they were able to get a medication dispenser for patient. She reports that at first, patient was reluctant and not thrilled, but it appears that she is doing what she needs to do in order to dispense the pills. Daughter-in-law fills the dispenser for her. She reports that patient recently had a fall. Her and her  visited her to bring groceries and they found her sitting on the floor of her bathroom. She was able to get herself up off the ground. Did express some soreness, but did not require a trip to the ED. She has a walker, but does not use it. She also has a pendant to alert facility staff that she needs help, but seems to forget to push the button. Guillermo requested that RN staff check on her throughout the day yesterday. They are considering having her move to [facility] in the near future.     Care Gaps:    Health Maintenance Due   Topic Date Due     ZOSTER IMMUNIZATION (1 of 2) Never done     MEDICARE ANNUAL WELLNESS VISIT  Never done     ADVANCE CARE PLANNING  09/17/2020     INFLUENZA VACCINE (1) 09/01/2021       Goals addressed this encounter:   Goals Addressed                    This Visit's Progress       Patient Stated       Medical (pt-stated)   100%      Goal Statement: Medication management  Date Goal set: 7/27/2021  Barriers: Patient particular about how medications are taken  Strengths: Patient's family are great advocates; Patient is enrolled in Care Coordination  Date to Achieve By: 10/2021  Patient expressed understanding of goal: No- Daughter in law  Action steps to achieve this goal:  1. Family will look in to pill dispenser  2. Patient will purchase a pill dispenser, if agreeable.              Psychosocial (pt-stated)         Goal Statement: May need to transition to Assisted Living within her building.   Date Goal set: 10/12/2021  Barriers:  Apartment is much smaller than current apartment  Strengths: Patient's family are great supports and advocates; Patient is enrolled in Care Coordination  Date to Achieve By: 3/2022  Patient expressed understanding of goal: Yes   Action steps to achieve this goal:  1. My family will work with me and facility on this potential transition  2. I will move in to assisted living within the next year, if desired.              Intervention/Education provided during outreach: Open ended questions.           Plan:   Care Coordinator will follow up in 1 month    LINDA Mosquera  Primary Care Clinic- Social Work Care Coordinator  [facility] [facility]  Ph: [phone]  10/12/2021 1:24 PM

## 2021-10-19 DIAGNOSIS — E03.9 ACQUIRED HYPOTHYROIDISM: ICD-10-CM

## 2021-10-20 RX ORDER — THYROID,PORK 90 MG
1 TABLET ORAL DAILY
Qty: 30 TABLET | Refills: 0 | Status: SHIPPED | OUTPATIENT
Start: 2021-10-20 | End: 2021-11-01

## 2021-10-29 ENCOUNTER — LAB (OUTPATIENT)
Dept: LAB | Facility: CLINIC | Age: 86
End: 2021-10-29
Payer: MEDICARE

## 2021-10-29 DIAGNOSIS — E03.9 ACQUIRED HYPOTHYROIDISM: ICD-10-CM

## 2021-10-29 LAB
T3 SERPL-MCNC: 130 NG/DL (ref 60–181)
TSH SERPL DL<=0.005 MIU/L-ACNC: 1.48 MU/L (ref 0.4–4)

## 2021-10-29 PROCEDURE — 36415 COLL VENOUS BLD VENIPUNCTURE: CPT

## 2021-10-29 PROCEDURE — 84480 ASSAY TRIIODOTHYRONINE (T3): CPT

## 2021-10-29 PROCEDURE — 84443 ASSAY THYROID STIM HORMONE: CPT

## 2021-11-01 DIAGNOSIS — E03.9 ACQUIRED HYPOTHYROIDISM: ICD-10-CM

## 2021-11-01 RX ORDER — THYROID,PORK 90 MG
1 TABLET ORAL DAILY
Qty: 90 TABLET | Refills: 1 | Status: SHIPPED | OUTPATIENT
Start: 2021-11-01 | End: 2022-01-01

## 2021-11-01 NOTE — RESULT ENCOUNTER NOTE
Ania,  Your thyroid tests look great. Please continue your current thyroid med dosing.   Kind regards,  Juliette Ordaz MD

## 2021-11-12 ENCOUNTER — PATIENT OUTREACH (OUTPATIENT)
Dept: CARE COORDINATION | Facility: CLINIC | Age: 86
End: 2021-11-12
Payer: MEDICARE

## 2021-11-12 NOTE — PROGRESS NOTES
Clinic Care Coordination Contact  [facility]/Voicemail       Clinical Data: Care Coordinator Outreach  Outreach attempted x 1.  Left message on daaidether in Guillermo scott's voicemail with call back information and requested return call.  Plan: Care Coordinator will try to reach patient again in 3 weeks.    LINDA Mosquera  Primary Care Clinic- Social Work Care Coordinator  [facility]  Ph: [phone]  11/12/2021 2:57 PM

## 2021-12-07 ENCOUNTER — PATIENT OUTREACH (OUTPATIENT)
Dept: NURSING | Facility: CLINIC | Age: 86
End: 2021-12-07
Payer: MEDICARE

## 2022-01-01 ENCOUNTER — HEALTH MAINTENANCE LETTER (OUTPATIENT)
Age: 87
End: 2022-01-01

## 2022-01-01 ENCOUNTER — VIRTUAL VISIT (OUTPATIENT)
Dept: FAMILY MEDICINE | Facility: CLINIC | Age: 87
End: 2022-01-01
Payer: MEDICARE

## 2022-01-01 ENCOUNTER — PRE VISIT (OUTPATIENT)
Dept: OTOLARYNGOLOGY | Facility: CLINIC | Age: 87
End: 2022-01-01

## 2022-01-01 ENCOUNTER — TELEPHONE (OUTPATIENT)
Dept: FAMILY MEDICINE | Facility: CLINIC | Age: 87
End: 2022-01-01
Payer: MEDICARE

## 2022-01-01 ENCOUNTER — OFFICE VISIT (OUTPATIENT)
Dept: FAMILY MEDICINE | Facility: CLINIC | Age: 87
End: 2022-01-01
Payer: MEDICARE

## 2022-01-01 ENCOUNTER — MYC MEDICAL ADVICE (OUTPATIENT)
Dept: FAMILY MEDICINE | Facility: CLINIC | Age: 87
End: 2022-01-01

## 2022-01-01 ENCOUNTER — MYC MEDICAL ADVICE (OUTPATIENT)
Dept: FAMILY MEDICINE | Facility: CLINIC | Age: 87
End: 2022-01-01
Payer: MEDICARE

## 2022-01-01 ENCOUNTER — LAB (OUTPATIENT)
Dept: LAB | Facility: CLINIC | Age: 87
End: 2022-01-01
Payer: MEDICARE

## 2022-01-01 VITALS
HEART RATE: 70 BPM | BODY MASS INDEX: 28.13 KG/M2 | TEMPERATURE: 97.9 F | HEIGHT: 58 IN | DIASTOLIC BLOOD PRESSURE: 71 MMHG | RESPIRATION RATE: 22 BRPM | OXYGEN SATURATION: 94 % | WEIGHT: 134 LBS | SYSTOLIC BLOOD PRESSURE: 138 MMHG

## 2022-01-01 DIAGNOSIS — Z00.00 ENCOUNTER FOR MEDICARE ANNUAL WELLNESS EXAM: Primary | ICD-10-CM

## 2022-01-01 DIAGNOSIS — I10 BENIGN ESSENTIAL HYPERTENSION: Primary | ICD-10-CM

## 2022-01-01 DIAGNOSIS — R26.81 GAIT INSTABILITY: ICD-10-CM

## 2022-01-01 DIAGNOSIS — I10 BENIGN ESSENTIAL HYPERTENSION: ICD-10-CM

## 2022-01-01 DIAGNOSIS — H61.23 BILATERAL IMPACTED CERUMEN: ICD-10-CM

## 2022-01-01 DIAGNOSIS — F03.90 DEMENTIA WITHOUT BEHAVIORAL DISTURBANCE, UNSPECIFIED DEMENTIA TYPE: ICD-10-CM

## 2022-01-01 DIAGNOSIS — R45.86 MOOD CHANGES: ICD-10-CM

## 2022-01-01 DIAGNOSIS — E03.9 ACQUIRED HYPOTHYROIDISM: ICD-10-CM

## 2022-01-01 DIAGNOSIS — Z23 HIGH PRIORITY FOR 2019-NCOV VACCINE: ICD-10-CM

## 2022-01-01 DIAGNOSIS — F03.90 DEMENTIA, UNSPECIFIED DEMENTIA SEVERITY, UNSPECIFIED DEMENTIA TYPE, UNSPECIFIED WHETHER BEHAVIORAL, PSYCHOTIC, OR MOOD DISTURBANCE OR ANXIETY (H): ICD-10-CM

## 2022-01-01 DIAGNOSIS — I10 HYPERTENSION GOAL BP (BLOOD PRESSURE) < 150/90: ICD-10-CM

## 2022-01-01 DIAGNOSIS — H91.93 DECREASED HEARING OF BOTH EARS: ICD-10-CM

## 2022-01-01 DIAGNOSIS — E03.9 ACQUIRED HYPOTHYROIDISM: Primary | ICD-10-CM

## 2022-01-01 DIAGNOSIS — Z23 NEED FOR PROPHYLACTIC VACCINATION AND INOCULATION AGAINST INFLUENZA: ICD-10-CM

## 2022-01-01 LAB
ANION GAP SERPL CALCULATED.3IONS-SCNC: 7 MMOL/L (ref 3–14)
BASOPHILS # BLD AUTO: 0.1 10E3/UL (ref 0–0.2)
BASOPHILS NFR BLD AUTO: 1 %
BUN SERPL-MCNC: 20 MG/DL (ref 7–30)
CALCIUM SERPL-MCNC: 9.3 MG/DL (ref 8.5–10.1)
CHLORIDE BLD-SCNC: 102 MMOL/L (ref 94–109)
CO2 SERPL-SCNC: 29 MMOL/L (ref 20–32)
CREAT SERPL-MCNC: 0.76 MG/DL (ref 0.52–1.04)
EOSINOPHIL # BLD AUTO: 0.6 10E3/UL (ref 0–0.7)
EOSINOPHIL NFR BLD AUTO: 6 %
ERYTHROCYTE [DISTWIDTH] IN BLOOD BY AUTOMATED COUNT: 12.7 % (ref 10–15)
GFR SERPL CREATININE-BSD FRML MDRD: 72 ML/MIN/1.73M2
GLUCOSE BLD-MCNC: 117 MG/DL (ref 70–99)
HCT VFR BLD AUTO: 40.3 % (ref 35–47)
HGB BLD-MCNC: 13 G/DL (ref 11.7–15.7)
IMM GRANULOCYTES # BLD: 0 10E3/UL
IMM GRANULOCYTES NFR BLD: 0 %
LYMPHOCYTES # BLD AUTO: 3.2 10E3/UL (ref 0.8–5.3)
LYMPHOCYTES NFR BLD AUTO: 32 %
MCH RBC QN AUTO: 28.5 PG (ref 26.5–33)
MCHC RBC AUTO-ENTMCNC: 32.3 G/DL (ref 31.5–36.5)
MCV RBC AUTO: 88 FL (ref 78–100)
MONOCYTES # BLD AUTO: 1.2 10E3/UL (ref 0–1.3)
MONOCYTES NFR BLD AUTO: 12 %
NEUTROPHILS # BLD AUTO: 5 10E3/UL (ref 1.6–8.3)
NEUTROPHILS NFR BLD AUTO: 50 %
PLATELET # BLD AUTO: 323 10E3/UL (ref 150–450)
POTASSIUM BLD-SCNC: 4 MMOL/L (ref 3.4–5.3)
RBC # BLD AUTO: 4.56 10E6/UL (ref 3.8–5.2)
SODIUM SERPL-SCNC: 138 MMOL/L (ref 133–144)
T3 SERPL-MCNC: 73 NG/DL (ref 85–202)
T4 FREE SERPL-MCNC: 0.75 NG/DL (ref 0.76–1.46)
TSH SERPL DL<=0.005 MIU/L-ACNC: 2.47 MU/L (ref 0.4–4)
VIT B12 SERPL-MCNC: 509 PG/ML (ref 232–1245)
WBC # BLD AUTO: 10 10E3/UL (ref 4–11)

## 2022-01-01 PROCEDURE — 36415 COLL VENOUS BLD VENIPUNCTURE: CPT

## 2022-01-01 PROCEDURE — 84439 ASSAY OF FREE THYROXINE: CPT

## 2022-01-01 PROCEDURE — 84480 ASSAY TRIIODOTHYRONINE (T3): CPT

## 2022-01-01 PROCEDURE — 90662 IIV NO PRSV INCREASED AG IM: CPT | Performed by: FAMILY MEDICINE

## 2022-01-01 PROCEDURE — 91313 COVID-19,PF,MODERNA BIVALENT: CPT | Performed by: FAMILY MEDICINE

## 2022-01-01 PROCEDURE — 85025 COMPLETE CBC W/AUTO DIFF WBC: CPT

## 2022-01-01 PROCEDURE — 80048 BASIC METABOLIC PNL TOTAL CA: CPT

## 2022-01-01 PROCEDURE — 84443 ASSAY THYROID STIM HORMONE: CPT

## 2022-01-01 PROCEDURE — 99214 OFFICE O/P EST MOD 30 MIN: CPT | Mod: 95 | Performed by: FAMILY MEDICINE

## 2022-01-01 PROCEDURE — G0439 PPPS, SUBSEQ VISIT: HCPCS | Performed by: FAMILY MEDICINE

## 2022-01-01 PROCEDURE — 99214 OFFICE O/P EST MOD 30 MIN: CPT | Mod: 25 | Performed by: FAMILY MEDICINE

## 2022-01-01 PROCEDURE — G0008 ADMIN INFLUENZA VIRUS VAC: HCPCS | Performed by: FAMILY MEDICINE

## 2022-01-01 PROCEDURE — 0134A COVID-19,PF,MODERNA BIVALENT: CPT | Performed by: FAMILY MEDICINE

## 2022-01-01 PROCEDURE — 82607 VITAMIN B-12: CPT

## 2022-01-01 RX ORDER — FLUOXETINE 10 MG/1
10 CAPSULE ORAL DAILY
Qty: 30 CAPSULE | Refills: 3 | Status: SHIPPED | OUTPATIENT
Start: 2022-01-01 | End: 2022-01-01

## 2022-01-01 RX ORDER — THYROID,PORK 90 MG
1 TABLET ORAL DAILY
Qty: 90 TABLET | Refills: 1 | Status: SHIPPED | OUTPATIENT
Start: 2022-01-01

## 2022-01-01 RX ORDER — POTASSIUM CHLORIDE 1500 MG/1
TABLET, EXTENDED RELEASE ORAL
Qty: 90 TABLET | Refills: 1 | Status: SHIPPED | OUTPATIENT
Start: 2022-01-01 | End: 2023-01-01

## 2022-01-01 RX ORDER — AMLODIPINE BESYLATE 2.5 MG/1
2.5 TABLET ORAL DAILY
Qty: 30 TABLET | Refills: 1 | Status: SHIPPED | OUTPATIENT
Start: 2022-01-01 | End: 2022-01-01

## 2022-01-01 RX ORDER — HYDROCHLOROTHIAZIDE 25 MG/1
25 TABLET ORAL DAILY
Qty: 90 TABLET | Refills: 1 | Status: SHIPPED | OUTPATIENT
Start: 2022-01-01

## 2022-01-01 RX ORDER — HYDROCHLOROTHIAZIDE 25 MG/1
25 TABLET ORAL DAILY
Qty: 90 TABLET | Refills: 1 | Status: SHIPPED | OUTPATIENT
Start: 2022-01-01 | End: 2022-01-01

## 2022-01-01 RX ORDER — PROPRANOLOL HYDROCHLORIDE 160 MG/1
160 CAPSULE, EXTENDED RELEASE ORAL DAILY
Qty: 90 CAPSULE | Refills: 1 | Status: SHIPPED | OUTPATIENT
Start: 2022-01-01

## 2022-01-01 RX ORDER — PROPRANOLOL HYDROCHLORIDE 80 MG/1
TABLET ORAL
Qty: 180 TABLET | Refills: 3 | OUTPATIENT
Start: 2022-01-01

## 2022-01-01 RX ORDER — PROPRANOLOL HYDROCHLORIDE 80 MG/1
TABLET ORAL
Qty: 180 TABLET | Refills: 3 | Status: SHIPPED | OUTPATIENT
Start: 2022-01-01 | End: 2022-01-01

## 2022-01-01 RX ORDER — AMLODIPINE BESYLATE 5 MG/1
TABLET ORAL
Qty: 90 TABLET | Refills: 1 | Status: SHIPPED | OUTPATIENT
Start: 2022-01-01

## 2022-01-01 RX ORDER — AMLODIPINE BESYLATE 5 MG/1
TABLET ORAL
Qty: 90 TABLET | Refills: 1 | Status: SHIPPED | OUTPATIENT
Start: 2022-01-01 | End: 2022-01-01

## 2022-01-01 RX ORDER — THYROID,PORK 90 MG
1 TABLET ORAL DAILY
Qty: 90 TABLET | Refills: 1 | Status: SHIPPED | OUTPATIENT
Start: 2022-01-01 | End: 2022-01-01

## 2022-01-01 RX ORDER — AMLODIPINE BESYLATE 2.5 MG/1
2.5 TABLET ORAL DAILY
Qty: 90 TABLET | Refills: 3 | Status: SHIPPED | OUTPATIENT
Start: 2022-01-01

## 2022-01-01 ASSESSMENT — ENCOUNTER SYMPTOMS
COUGH: 0
WEAKNESS: 0
HEADACHES: 0
HEMATURIA: 0
HEMATURIA: 0
DYSURIA: 0
NERVOUS/ANXIOUS: 0
HEARTBURN: 0
MYALGIAS: 0
ARTHRALGIAS: 1
NERVOUS/ANXIOUS: 0
DYSURIA: 0
CHILLS: 0
FEVER: 0
COUGH: 0
FREQUENCY: 0
ABDOMINAL PAIN: 0
CONSTIPATION: 0
NAUSEA: 0
HEMATOCHEZIA: 0
DIZZINESS: 0
MYALGIAS: 0
DIARRHEA: 0
WEAKNESS: 0
PARESTHESIAS: 0
JOINT SWELLING: 0
ABDOMINAL PAIN: 0
EYE PAIN: 0
EYE PAIN: 0
HEADACHES: 0
CHILLS: 0
JOINT SWELLING: 0
BREAST MASS: 0
SHORTNESS OF BREATH: 0
PALPITATIONS: 0
HEMATOCHEZIA: 0
PALPITATIONS: 0
PARESTHESIAS: 0
CONSTIPATION: 0
BREAST MASS: 0
DIARRHEA: 0
DIZZINESS: 0
FREQUENCY: 0
ARTHRALGIAS: 1
SHORTNESS OF BREATH: 0
FEVER: 0
HEARTBURN: 0
SORE THROAT: 0
SORE THROAT: 0
NAUSEA: 0

## 2022-01-01 ASSESSMENT — ACTIVITIES OF DAILY LIVING (ADL)
CURRENT_FUNCTION: HOUSEWORK REQUIRES ASSISTANCE
CURRENT_FUNCTION: MEDICATION ADMINISTRATION REQUIRES ASSISTANCE
CURRENT_FUNCTION: MEDICATION ADMINISTRATION REQUIRES ASSISTANCE
CURRENT_FUNCTION: MONEY MANAGEMENT REQUIRES ASSISTANCE
CURRENT_FUNCTION: BATHING REQUIRES ASSISTANCE
CURRENT_FUNCTION: TRANSPORTATION REQUIRES ASSISTANCE
CURRENT_FUNCTION: TRANSPORTATION REQUIRES ASSISTANCE
CURRENT_FUNCTION: MONEY MANAGEMENT REQUIRES ASSISTANCE
CURRENT_FUNCTION: HOUSEWORK REQUIRES ASSISTANCE
CURRENT_FUNCTION: BATHING REQUIRES ASSISTANCE

## 2022-01-01 ASSESSMENT — PAIN SCALES - GENERAL: PAINLEVEL: NO PAIN (0)

## 2022-02-01 NOTE — PROGRESS NOTES
Clinic Care Coordination Contact    Follow Up Progress Note      Assessment: [facility] spoke with daughter in law, Guillermo. She stated that things have continued to go well with patient. She has been doing pretty good at managing her medications, per Guillermo. Staff checks in on her in the morning and evening.     Care Gaps:    Health Maintenance Due   Topic Date Due     ZOSTER IMMUNIZATION (1 of 2) Never done     MEDICARE ANNUAL WELLNESS VISIT  Never done     ADVANCE CARE PLANNING  09/17/2020     INFLUENZA VACCINE (1) 09/01/2021     Care Gaps not addressed during this encounter d/t focus on psychosocial needs. Will address once psychosocial needs are met      Goals addressed this encounter:   Goals Addressed                    This Visit's Progress       Patient Stated       Psychosocial (pt-stated)   On Hold      Goal Statement: May need to transition to Assisted Living within her building.   Date Goal set: 10/12/2021  Barriers: Apartment is much smaller than current apartment  Strengths: Patient's family are great supports and advocates; Patient is enrolled in Care Coordination  Date to Achieve By: 3/2022  Patient expressed understanding of goal: Yes   Action steps to achieve this goal:  1. My family will work with me and facility on this potential transition  2. I will move in to assisted living within the next year, if desired.              Intervention/Education provided during outreach: Open ended questions     Outreach Frequency: monthly    Plan:   Care Coordinator will follow up in 2 months. Patient has been moved to maintenance status.    LINDA Mosquera  Primary Care Clinic- Social Work Care Coordinator  [facility]  Ph: [phone]  12/7/2021 2:35 PM     Vaccine Information Statement(s) or the Emergency Use Authorization was given today. This has been reviewed, questions answered, and verbal consent given by Patient for injection(s) and administration of Shingles.        Patient tolerated without incident. See immunization grid for documentation.

## 2022-02-08 ENCOUNTER — PATIENT OUTREACH (OUTPATIENT)
Dept: NURSING | Facility: CLINIC | Age: 87
End: 2022-02-08
Payer: MEDICARE

## 2022-02-08 ENCOUNTER — PATIENT OUTREACH (OUTPATIENT)
Dept: CARE COORDINATION | Facility: CLINIC | Age: 87
End: 2022-02-08
Payer: MEDICARE

## 2022-02-08 NOTE — PROGRESS NOTES
Clinic Care Coordination Contact    Assessment: Care Coordinator contacted patient for 2 month follow up.  Patient has continued to follow the plan of care and assessment is negative for any new needs or concerns. Daughter in law, Guillermo, did mention that patient was having some issues with falling in to some scams and gave out her personal information, like her social security number. Family has been in contact with the bank, social security administration and authorities for this issue and feel they have things under control. Patient is now also allowing for son to handle her finances, which is a relief to them.     Enrollment status: Graduated.      Plan: No further outreaches at this time.  Patient will continue to follow the plan of care.  If new needs arise a new Care Coordination referral may be placed.      Alex Easton, JEANMARIE  Primary Care Clinic- Social Work Care Coordinator  [facility]  Ph: [phone]  2/8/2022 9:21 AM

## 2022-03-23 NOTE — PROGRESS NOTES
Ania is a 89 year old who is being evaluated via a billable video visit.      How would you like to obtain your AVS? MyChart  If the video visit is dropped, the invitation should be resent by: email:  [email or web address]   Will anyone else be joining your video visit? No -daughter in lawGuillermo    Video Start Time: 5:50pm    Assessment & Plan     Benign essential hypertension  Not adequately controlled.  Possibly missed beta-blocker dose in the afternoon could contribute slightly but I doubt this is playing much impact on her BPs.  I am hesitant to further increase beta-blocker due to her heart rate.  Will trial a very small dose increase of her amlodipine up to 7.5 mg daily.   - amLODIPine (NORVASC) 2.5 MG tablet; Take 1 tablet (2.5 mg) by mouth daily  - potassium chloride ER (KLOR-CON M) 20 MEQ CR tablet; TAKE 1 TABLET(20 MEQ) BY MOUTH DAILY  - amLODIPine (NORVASC) 5 MG tablet; TAKE 1 TABLET(5 MG) BY MOUTH DAILY  - hydrochlorothiazide (HYDRODIURIL) 25 MG tablet; Take 1 tablet (25 mg) by mouth daily    Dementia without behavioral disturbance, unspecified dementia type (H)  Overall family feels she is safe currently and has the services she needs.  They have been closely monitoring her and have the ability to get more assistive care in place when and if needed.    Acquired hypothyroidism  Thyroid labs this winter were normal.  Continue current medications  - thyroid (ARMOUR THYROID) 90 MG tablet; Take 1 tablet (90 mg) by mouth daily         Patient Instructions   Increase amlodipine to 7.5 mg daily (amlodipine 5 mg + 2.5 mg tablet).         Return in about 4 months (around 7/23/2022) for Follow up, med check, in person.    Juliette Ordaz MD  [facility]   Ania is a 89 year old who presents for the following health issues  accompanied by her Daughter in law.    Thyroid Disease    History of Present Illness       Hypertension: She presents for follow up of  hypertension.  She does check blood pressure  regularly outside of the clinic. Outside blood pressures have been over 140/90. She does not follow a low salt diet.     She eats 2-3 servings of fruits and vegetables daily.She consumes 1 sweetened beverage(s) daily.She exercises with enough effort to increase her heart rate 9 or less minutes per day.  She exercises with enough effort to increase her heart rate 3 or less days per week. She is missing 1 dose(s) of medications per week.  She is not taking prescribed medications regularly due to remembering to take.       Hypertension Follow-up      Do you check your blood pressure regularly outside of the clinic? Yes     Are you following a low salt diet? No    Are your blood pressures ever more than 140 on the top number (systolic) OR more   than 90 on the bottom number (diastolic), for example 140/90? No - systolic sometimes higher, 150's or 160's    Hypothyroidism Follow-up    Daughter in law wants to discuss Ania becoming more forgetful, dementia worsening -still lives in independent living but being checked x2/day.     Overall doing well.   Gets checked twice a day as part of her current housing agreement  Has med container and compliance has improved with this. occ forgets meds in the afternoon- propranolol and potassium. .   [facility] independent living but does have AL abilities if needed. Nursing dept is present.    First restaurant visit recently which was good for both patient and her family they report  Does not cook but still microwaves her food for meals. Can also eat at facility but hasn't needed.   No major weight changes that they know of.  Clothes seem to still be fitting normally    156/77.  Home BPs are 130-150/70's, ox 96, HR 66.     Home care [facility] last summer  Daughter in law talking with SW monthly through care coordination.   As family they have decided not to pursue further home care but will have patient access services in her building as they feel  services are needed..     Fell last summer 2 x's. Balance is now better and now using her walker more consistently which has helped prevent falls.       Review of Systems   Constitutional, HEENT, cardiovascular, pulmonary, gi and gu systems are negative, except as otherwise noted.      Objective           Vitals:  No vitals were obtained today due to virtual visit.    Physical Exam   GENERAL: Healthy, alert and no distress  EYES: Eyes grossly normal to inspection.  No discharge or erythema, or obvious scleral/conjunctival abnormalities.  RESP: No audible wheeze, cough, or visible cyanosis.  No visible retractions or increased work of breathing.    SKIN: Visible skin clear. No significant rash, abnormal pigmentation or lesions.  NEURO: Cranial nerves grossly intact.  Mentation and speech appropriate for age.  PSYCH: Mentation appears normal, affect normal/bright, judgement and insight intact, normal speech and appearance well-groomed.        Video-Visit Details    Type of service:  Video Visit    Video End Time:5:53pm    Originating Location (pt. Location): Home    Distant Location (provider location):  [facility]     Platform used for Video Visit: [place] and then another few minutes on [employer]. I was able to view patient well but had poor audio connection to remainder of visit was via phone (17 min on phone)

## 2022-07-11 NOTE — PATIENT INSTRUCTIONS
Start fluoxetine 10 mg once daily in the morning. It will take severa weeks to see the benefits of this medication.     Please schedule lab only visit

## 2022-07-11 NOTE — PROGRESS NOTES
Ania is a 89 year old who is being evaluated via a billable video visit.  She is accompanied by her daughter-in-law    How would you like to obtain your AVS? MyChart  If the video visit is dropped, the invitation should be resent by: Text to cell phone: [phone]  Will anyone else be joining your video visit? No          Assessment & Plan     Benign essential hypertension  Much improved on recent dose change with amlodiipine. No significant increase in chronic edema.   - amLODIPine (NORVASC) 5 MG tablet; TAKE 1 TABLET(5 MG) BY MOUTH DAILY. Profile Rx: patient will contact pharmacy when needed  - hydrochlorothiazide (HYDRODIURIL) 25 MG tablet; Take 1 tablet (25 mg) by mouth daily Profile Rx: patient will contact pharmacy when needed    Acquired hypothyroidism  Due for labs  - TSH with free T4 reflex; Future  - T3, total; Future  - thyroid (ARMOUR THYROID) 90 MG tablet; Take 1 tablet (90 mg) by mouth daily Profile Rx: patient will contact pharmacy when needed    Mood changes  Unclear if this is due to below, grief (her close  in the assisted living passed away during pandemic), metabolic or other. Family is interested in patient trying anti-depressant and patient is open to it. Reviewed onset of action of meds, common side effects and plan for close f/u. Encouraged call to clinic if symptoms worsening or adverse reactions.   - CBC with platelets and differential; Future  - Vitamin B12; Future  - FLUoxetine (PROZAC) 10 MG capsule; Take 1 capsule (10 mg) by mouth daily    Dementia without behavioral disturbance, unspecified dementia type (H)  Slight progression of memory loss but still managing ok in her assisted living. Family very involved.   - CBC with platelets and differential; Future  - Vitamin B12; Future  - FLUoxetine (PROZAC) 10 MG capsule; Take 1 capsule (10 mg) by mouth daily    Gait instability  Using walker. No significant falls recently  - CBC with platelets and differential; Future  - Vitamin  "B12; Future    Hypertension goal BP (blood pressure) < 150/90  - propranolol (INDERAL) 80 MG tablet; TAKE 1 TABLET(80 MG) BY MOUTH TWICE DAILY         Patient Instructions   Start fluoxetine 10 mg once daily in the morning. It will take severa weeks to see the benefits of this medication.     Please schedule lab only visit       Return in about 4 months (around 2022) for Follow up, med check, Routine preventive.    Juliette Ordaz MD  [facility] OTONIEL Jorge is a 89 year old accompanied by her daughter in law , presenting for the following health issues:  Hypertension      History of Present Illness       Hypertension: She presents for follow up of hypertension.  She does check blood pressure  regularly outside of the clinic. Outside blood pressures have been over 140/90. She does not follow a low salt diet.         Amlodipine dose was increased from 5 mg to 7.5 mg last visit. Generally bp 120-130's  No AE  Chronic lower extrem edema, dependent and worse in the evening.   Sits with legs up  Seems to be taking meds faithfully    covid restrictions,   Limited socializing (still playing [employer]) but afraid to leave  Mood is exhausting.   Thinks she is dying  Has staff check in on her twice daily  Has call button    Little more forgetful  Family is visiting a little more often. Does not really want to do outings.  Still getting dressed regularly and still making frozen foods in her own apt in microwave. Eat a lot of sweets.  Eating normally  Family feels she is really lonely. Feeling more blue. Not always taking advantage of socializing. Her previous  where she lives  recently and family noted she is more down since then.  Sleeping well.   Notes its \"not easy\" having to stay in her apt due to the pandemic  No recent falls. (last was last fall- was seen at [facility]). Has walker. Better using it than she was.   ? Maybe one recent    Oxygen 96, RR16, 158/78, " HR72        Review of Systems   Constitutional,  cardiovascular, pulmonary are negative, except as otherwise noted.      Objective           Vitals:  No vitals were obtained today due to virtual visit.    Physical Exam   GENERAL: Healthy, alert and no distress  EYES: Eyes grossly normal to inspection.  No discharge or erythema, or obvious scleral/conjunctival abnormalities.  RESP: No audible wheeze, cough, or visible cyanosis.  No visible retractions or increased work of breathing.    SKIN: Visible skin clear. No significant rash, abnormal pigmentation or lesions.  NEURO: Cranial nerves grossly intact.  Mentation and speech appropriate for age.  PSYCH: Mentation appears normal, affect normal/bright, judgement and insight intact, normal speech and appearance well-groomed.                Video-Visit Details    Video Start Time: 11:37 AM    Type of service:  Video Visit    Video End Time:11:56 AM    Originating Location (pt. Location): Home    Distant Location (provider location):  [facility]     Platform used for Video Visit: [place], phone    .  ..

## 2022-07-19 NOTE — TELEPHONE ENCOUNTER
"Routing refill request to provider for review/approval because:  Requested Prescriptions   Pending Prescriptions Disp Refills    ARMOUR THYROID 90 MG tablet [Pharmacy Med Name: THYROID (ARMOUR) 1.5GR (90MG) TABS] 90 tablet 0     Sig: TAKE 1 TABLET BY MOUTH ONCE DAILY        Thyroid Protocol Failed - 10/19/2021  3:28 AM        Failed - Normal TSH on file in past 12 months     Recent Labs   Lab Test 07/12/21  1730   TSH 4.11*              Passed - Patient is 12 years or older        Passed - Recent (12 mo) or future (30 days) visit within the authorizing provider's specialty     Patient has had an office visit with the authorizing provider or a provider within the authorizing providers department within the previous 12 mos or has a future within next 30 days. See \"Patient Info\" tab in inbasket, or \"Choose Columns\" in Meds & Orders section of the refill encounter.              Passed - Medication is active on med list        Passed - No active pregnancy on record     If patient is pregnant or has had a positive pregnancy test, please check TSH.          Passed - No positive pregnancy test in past 12 months     If patient is pregnant or has had a positive pregnancy test, please check TSH.                  Bhavna TIANN, RN        " CT findings; stable 54mm

## 2022-09-23 NOTE — TELEPHONE ENCOUNTER
BP Readings from Last 3 Encounters:   07/12/21 (!) 146/84   11/21/19 (!) 166/69   05/22/19 140/62     Pulse Readings from Last 3 Encounters:   07/12/21 77   11/21/19 66   05/22/19 63       Switching directly over the the propranolol extended release 160mg one tablet every morning should be fine.    I have pended the order so you can add desired days supply and refills.    Thanks!  Betsy Chapman, Pharm.D., [facility]  Medication Therapy Management Pharmacist  [phone]

## 2022-11-07 NOTE — PATIENT INSTRUCTIONS
Consider a Boost supplement once daily.   Patient Education   Personalized Prevention Plan  You are due for the preventive services outlined below.  Your care team is available to assist you in scheduling these services.  If you have already completed any of these items, please share that information with your care team to update in your medical record.  Health Maintenance Due   Topic Date Due     Hepatitis B Vaccine (1 of 3 - 3-dose series) Never done     Zoster (Shingles) Vaccine (1 of 2) Never done     Annual Wellness Visit  Never done     COVID-19 Vaccine (5 - Booster for Moderna series) 08/08/2022     Flu Vaccine (1) 09/01/2022     Your Health Risk Assessment indicates you feel you are not in good health    A healthy lifestyle helps keep the body fit and the mind alert. It helps protect you from disease, helps you fight disease, and helps prevent chronic disease (disease that doesn't go away) from getting worse. This is important as you get older and begin to notice twinges in muscles and joints and a decline in the strength and stamina you once took for granted. A healthy lifestyle includes good healthcare, good nutrition, weight control, recreation, and regular exercise. Avoid harmful substances and do what you can to keep safe. Another part of a healthy lifestyle is stay mentally active and socially involved.    Good healthcare     Have a wellness visit every year.     If you have new symptoms, let us know right away. Don't wait until the next checkup.     Take medicines exactly as prescribed and keep your medicines in a safe place. Tell us if your medicine causes problems.   Healthy diet and weight control     Eat 3 or 4 small, nutritious, low-fat, high-fiber meals a day. Include a variety of fruits, vegetables, and whole-grain foods.     Make sure you get enough calcium in your diet. Calcium, vitamin D, and exercise help prevent osteoporosis (bone thinning).     If you live alone, try eating with others  when you can. That way you get a good meal and have company while you eat it.     Try to keep a healthy weight. If you eat more calories than your body uses for energy, it will be stored as fat and you will gain weight.     Recreation   Recreation is not limited to sports and team events. It includes any activity that provides relaxation, interest, enjoyment, and exercise. Recreation provides an outlet for physical, mental, and social energy. It can give a sense of worth and achievement. It can help you stay healthy.    Mental Exercise and Social Involvement  Mental and emotional health is as important as physical health. Keep in touch with friends and family. Stay as active as possible. Continue to learn and challenge yourself.   Things you can do to stay mentally active are:    Learn something new, like a foreign language or musical instrument.     Play SCRABBLE or do crossword puzzles. If you cannot find people to play these games with you at home, you can play them with others on your computer through the Internet.     Join a games club--anything from card games to chess or checkers or lawn bowling.     Start a new hobby.     Go back to school.     Volunteer.     Read.   Keep up with world events.    Exercise for a Healthier Heart  You may wonder how you can improve the health of your heart. If you re thinking about exercise, you re on the right track. You don t need to become an athlete. But you do need a certain amount of brisk exercise to help strengthen your heart. If you have been diagnosed with a heart condition, your healthcare provider may advise exercise to help stabilize your condition. To help make exercise a habit, choose safe, fun activities.      Exercise with a friend. When activity is fun, you're more likely to stick with it.   Before you start  Check with your healthcare provider before starting an exercise program. This is especially important if you have not been active for a while. It's also  important if you have a long-term (chronic) health problem such as heart disease, diabetes, or obesity. Or if you are at high risk for having these problems.   Why exercise?  Exercising regularly offers many healthy rewards. It can help you do all of the following:     Improve your blood cholesterol level to help prevent further heart trouble    Lower your blood pressure to help prevent a stroke or heart attack    Control diabetes, or reduce your risk of getting this disease    Improve your heart and lung function    Reach and stay at a healthy weight    Make your muscles stronger so you can stay active    Prevent falls and fractures by slowing the loss of bone mass (osteoporosis)    Manage stress better    Reduce your blood pressure    Improve your sense of self and your body image  Exercise tips      Ease into your routine. Set small goals. Then build on them. If you are not sure what your activity level should be, talk with your healthcare provider first before starting an exercise routine.    Exercise on most days. Aim for a total of 150 minutes (2 hours and 30 minutes) or more of moderate-intensity aerobic activity each week. Or 75 minutes (1 hour and 15 minutes) or more of vigorous-intensity aerobic activity each week. Or try for a combination of both. Moderate activity means that you breathe heavier and your heart rate increases but you can still talk. Think about doing 40 minutes of moderate exercise, 3 to 4 times a week. For best results, activity should last for about 40 minutes to lower blood pressure and cholesterol. It's OK to work up to the 40-minute period over time. Examples of moderate-intensity activity are walking 1 mile in 15 minutes. Or doing 30 to 45 minutes of yard work.    Step up your daily activity level.  Along with your exercise program, try being more active the whole day. Walk instead of drive. Or park further away so that you take more steps each day. Do more household tasks or yard  work. You may not be able to meet the advised mount of physical activity. But doing some moderate- or vigorous-intensity aerobic activity can help reduce your risk for heart disease. Your healthcare provider can help you figure out what is best for you.    Choose 1 or more activities you enjoy.  Walking is one of the easiest things you can do. You can also try swimming, riding a bike, dancing, or taking an exercise class.    When to call your healthcare provider  Call your healthcare provider if you have any of these:     Chest pain or feel dizzy or lightheaded    Burning, tightness, pressure, or heaviness in your chest, neck, shoulders, back, or arms    Abnormal shortness of breath    More joint or muscle pain    A very fast or irregular heartbeat (palpitations)  [employer] last reviewed this educational content on 7/1/2019 2000-2021 [employer]. All rights reserved. This information is not intended as a substitute for professional medical care. Always follow your healthcare professional's instructions.          Understanding USDA MyPlate  The USDA has guidelines to help you make healthy food choices. These are called MyPlate. MyPlate shows the food groups that make up healthy meals using the image of a place setting. Before you eat, think about the healthiest choices for what to put on your plate or in your cup or bowl. To learn more about building a healthy plate, visit www.choosemyplate.gov.    The food groups    Fruits. Any fruit or 100% fruit juice counts as part of the Fruit Group. Fruits may be fresh, canned, frozen, or dried, and may be whole, cut-up, or pureed. Make 1/2 of your plate fruits and vegetables.    Vegetables. Any vegetable or 100% vegetable juice counts as a member of the Vegetable Group. Vegetables may be fresh, frozen, canned, or dried. They can be served raw or cooked and may be whole, cut-up, or mashed. Make 1/2 of your plate fruits and vegetables.    Grains. All foods made  from grains are part of the Grains Group. These include wheat, rice, oats, cornmeal, and barley. Grains are often used to make foods such as bread, pasta, oatmeal, cereal, tortillas, and grits. Grains should be no more than 1/4 of your plate. At least half of your grains should be whole grains.    Protein. This group includes meat, poultry, seafood, beans and peas, eggs, processed soy products (such as tofu), nuts (including nut butters), and seeds. Make protein choices no more than 1/4 of your plate. Meat and poultry choices should be lean or low fat.    Dairy. The Dairy Group includes all fluid milk products and foods made from milk that contain calcium, such as yogurt and cheese. (Foods that have little calcium, such as cream, butter, and cream cheese, are not part of this group.) Most dairy choices should be low-fat or fat-free.    Oils. Oils aren't a food group, but they do contain essential nutrients. However it's important to watch your intake of oils. These are fats that are liquid at room temperature. They include canola, corn, olive, soybean, vegetable, and sunflower oil. Foods that are mainly oil include mayonnaise, certain salad dressings, and soft margarines. You likely already get your daily oil allowance from the foods you eat.  Things to limit  Eating healthy also means limiting these things in your diet:       Salt (sodium). Many processed foods have a lot of sodium. To keep sodium intake down, eat fresh vegetables, meats, poultry, and seafood when possible. Purchase low-sodium, reduced-sodium, or no-salt-added food products at the store. And don't add salt to your meals at home. Instead, season them with herbs and spices such as dill, oregano, cumin, and paprika. Or try adding flavor with lemon or lime zest and juice.    Saturated fat. Saturated fats are most often found in animal products such as beef, pork, and chicken. They are often solid at room temperature, such as butter. To reduce your  saturated fat intake, choose leaner cuts of meat and poultry. And try healthier cooking methods such as grilling, broiling, roasting, or baking. For a simple lower-fat swap, use plain nonfat yogurt instead of mayonnaise when making potato salad or macaroni salad.    Added sugars. These are sugars added to foods. They are in foods such as ice cream, candy, soda, fruit drinks, sports drinks, energy drinks, cookies, pastries, jams, and syrups. Cut down on added sugars by sharing sweet treats with a family member or friend. You can also choose fruit for dessert, and drink water or other unsweetened beverages.     [employer] last reviewed this educational content on 6/1/2020 2000-2021 [employer]. All rights reserved. This information is not intended as a substitute for professional medical care. Always follow your healthcare professional's instructions.        Activities of Daily Living    Your Health Risk Assessment indicates you have difficulties with activities of daily living such as housework, bathing, preparing meals, taking medication, etc. Please make a follow up appointment for us to address this issue in more detail.  Your Health Risk Assessment indicates you feel you are not in good emotional health.    Recreation   Recreation is not limited to sports and team events. It includes any activity that provides relaxation, interest, enjoyment, and exercise. Recreation provides an outlet for physical, mental, and social energy. It can give a sense of worth and achievement. It can help you stay healthy.    Mental Exercise and Social Involvement  Mental and emotional health is as important as physical health. Keep in touch with friends and family. Stay as active as possible. Continue to learn and challenge yourself.   Things you can do to stay mentally active are:    Learn something new, like a foreign language or musical instrument.     Play SCRABBLE or do crossword puzzles. If you cannot find people  to play these games with you at home, you can play them with others on your computer through the Internet.     Join a games club--anything from card games to chess or checkers or lawn bowling.     Start a new hobby.     Go back to school.     Volunteer.     Read.   Keep up with world events.

## 2022-11-07 NOTE — PROGRESS NOTES
"Refer Answers for HPI/ROS submitted by the patient on 11/6/2022  In general, how would you rate your overall physical health?: fair  Frequency of exercise:: None  Do you usually eat at least 4 servings of fruit and vegetables a day, include whole grains & fiber, and avoid regularly eating high fat or \"junk\" foods? : No  Taking medications regularly:: No  Medication side effects:: None  Activities of Daily Living: transportation requires assistance, housework requires assistance, bathing requires assistance, medication administration requires assistance, money management requires assistance  Home safety: no safety concerns identified  Hearing Impairment:: no hearing concerns  In the past 6 months, have you been bothered by leaking of urine?: No  abdominal pain: No  Blood in stool: No  Blood in urine: No  chest pain: No  chills: No  congestion: No  constipation: No  cough: No  diarrhea: No  dizziness: No  ear pain: No  eye pain: No  nervous/anxious: No  fever: No  frequency: No  genital sores: No  headaches: No  hearing loss: No  heartburn: No  arthralgias: Yes  joint swelling: No  peripheral edema: Yes  mood changes: Yes  myalgias: No  nausea: No  dysuria: No  palpitations: No  Skin sensation changes: No  sore throat: No  urgency: No  rash: No  shortness of breath: No  visual disturbance: No  weakness: No  pelvic pain: No  vaginal bleeding: No  vaginal discharge: No  tenderness: No  breast mass: No  breast discharge: No  In general, how would you rate your overall mental or emotional health?: fair  Additional concerns today:: No  Barriers to taking medications:: Problems remembering to take them    SUBJECTIVE:   Ania is a 89 year old who presents for Preventive Visit.      Patient has been advised of split billing requirements and indicates understanding: Yes  Are you in the first 12 months of your Medicare coverage?  No    Healthy Habits:     In general, how would you rate your overall health?  Fair    Frequency of " "exercise:  None    Do you usually eat at least 4 servings of fruit and vegetables a day, include whole grains    & fiber and avoid regularly eating high fat or \"junk\" foods?  No    Taking medications regularly:  No    Barriers to taking medications:  Problems remembering to take them    Medication side effects:  None    Ability to successfully perform activities of daily living:  Transportation requires assistance, housework requires assistance, bathing requires assistance, medication administration requires assistance and money management requires assistance    Home Safety:  No safety concerns identified    Hearing Impairment:  No hearing concerns    In the past 6 months, have you been bothered by leaking of urine?  No    In general, how would you rate your overall mental or emotional health?  Fair      PHQ-2 Total Score: 0    Additional concerns today:  No    Do you feel safe in your environment? Yes    Have you ever done Advance Care Planning? (For example, a Health Directive, POLST, or a discussion with a medical provider or your loved ones about your wishes): No, advance care planning information given to patient to review.  Patient declined advance care planning discussion at this time.      Fall risk  Fallen 2 or more times in the past year?: No  Any fall with injury in the past year?: No     Daughter in law, ladonna, with her today    Ladonna Reports borderline need for assisted living. Right now in independent living still.   Will have to move buildings if need assisted living and will need to downsize substantially which will be a big issue.   Can miss meds at times.   They are talking with nursing care at her facility.   Will be starting alarm based med dispenser to see if this helps with the missed doses.  Mainly sponge baths, family is monitoring her skin integrity/cleanliness and no skin integrity issues or poor care as noted yet. Needs assistance with showering.   Microwave meals are going okay. " "  Getting checked on more often by her family.   Lost 9 # in the last year. Continues to have a good appetite. Eats a lot of sweets.   Boost supplement occasionally.   Has someone check in on her every night.    Diarrhea x 1 last week.   Rare urinary incontinence  No recent falls.   Uses walker.    No issues with chewing or swallowing.  Notes she does this slowly given her missing teeth.    Mood- \"bored\", tried anti-depressant in past summer without benefit. No major concerns right now. Stopped and okay with being off right now.     Still plays bingo. Choose to not participate in many of the activities in her building.     Cognitive Screening   1) Repeat 3 items (Leader, Season, Table)    2) Clock draw: ABNORMAL known dementia  3) 3 item recall: Recalls NO objects   Results: 0 items recalled: PROBABLE COGNITIVE IMPAIRMENT, **INFORM PROVIDER**    Mini-CogTM Copyright JAY[employer]una. Licensed by the author for use in [facility]; reprinted with permission (jen@[facility]). All rights reserved.      Do you have sleep apnea, excessive snoring or daytime drowsiness?: no    Reviewed and updated as needed this visit by clinical staff   Tobacco  Allergies  Meds              Reviewed and updated as needed this visit by Provider                 Social History     Tobacco Use     Smoking status: Never     Smokeless tobacco: Never   Substance Use Topics     Alcohol use: Not Currently     Comment: rarely       Alcohol Use 11/6/2022   Prescreen: >3 drinks/day or >7 drinks/week? Not Applicable       Patient would like flu shot and covid booster     Current providers sharing in care for this patient include:   Patient Care Team:  Juliette Ordaz MD as PCP - General (Family Practice)  Juliette Ordaz MD as Assigned PCP    The following health maintenance items are reviewed in Epic and correct as of today:  Health Maintenance   Topic Date Due     HEPATITIS B IMMUNIZATION (1 of 3 - 3-dose series) Never " "done     ZOSTER IMMUNIZATION (1 of 2) Never done     MEDICARE ANNUAL WELLNESS VISIT  Never done     ADVANCE CARE PLANNING  09/17/2020     COVID-19 Vaccine (5 - Booster for Moderna series) 08/08/2022     INFLUENZA VACCINE (1) 09/01/2022     ANNUAL REVIEW OF HM ORDERS  03/23/2023     BMP  07/20/2023     TSH W/FREE T4 REFLEX  07/20/2023     FALL RISK ASSESSMENT  11/07/2023     DTAP/TDAP/TD IMMUNIZATION (2 - Td or Tdap) 07/12/2031     PHQ-2 (once per calendar year)  Completed     Pneumococcal Vaccine: 65+ Years  Completed     IPV IMMUNIZATION  Aged Out     MENINGITIS IMMUNIZATION  Aged Out       Pertinent mammograms are reviewed under the imaging tab.    Review of Systems   Constitutional: Negative for chills and fever.   HENT: Negative for congestion, ear pain, hearing loss and sore throat.    Eyes: Negative for pain and visual disturbance.   Respiratory: Negative for cough and shortness of breath.    Cardiovascular: Positive for peripheral edema. Negative for chest pain and palpitations.   Gastrointestinal: Negative for abdominal pain, constipation, diarrhea, heartburn, hematochezia and nausea.   Breasts:  Negative for tenderness, breast mass and discharge.   Genitourinary: Negative for dysuria, frequency, genital sores, hematuria, pelvic pain, urgency, vaginal bleeding and vaginal discharge.   Musculoskeletal: Positive for arthralgias. Negative for joint swelling and myalgias.   Skin: Negative for rash.   Neurological: Negative for dizziness, weakness, headaches and paresthesias.   Psychiatric/Behavioral: Positive for mood changes. The patient is not nervous/anxious.          OBJECTIVE:   /71 (BP Location: Right arm, Patient Position: Sitting, Cuff Size: Adult Regular)   Pulse 70   Temp 97.9  F (36.6  C) (Oral)   Resp 22   Ht 1.479 m (4' 10.23\")   Wt 60.8 kg (134 lb)   SpO2 94%   BMI 27.79 kg/m   Estimated body mass index is 27.79 kg/m  as calculated from the following:    Height as of this encounter: " "1.479 m (4' 10.23\").    Weight as of this encounter: 60.8 kg (134 lb).  Physical Exam  GENERAL: healthy, alert and no distress  EYES: Eyes grossly normal to inspection, PERRL and conjunctivae and sclerae normal  HENT: normal cephalic/atraumatic, both ears: occluded with wax, nose and mouth without ulcers or lesions, oropharynx clear and oral mucous membranes moist.  Several missing teeth  NECK: no adenopathy, no asymmetry, masses, or scars and thyroid normal to palpation  RESP: lungs clear to auscultation - no rales, rhonchi or wheezes  BREAST: normal without masses, tenderness or nipple discharge and no palpable axillary masses or adenopathy  CV: regular rate and rhythm, normal S1 S2, no S3 or S4, no murmur, click or rub, no peripheral edema and peripheral pulses strong  ABDOMEN: soft, nontender, no hepatosplenomegaly, no masses and bowel sounds normal  MS: no gross musculoskeletal defects noted, no edema  SKIN: no suspicious lesions or rashes  NEURO: Normal strength and tone, mentation intact and speech normal  PSYCH: mentation appears normal, affect normal/bright    Diagnostic Test Results:  Labs reviewed in Epic    ASSESSMENT / PLAN:   (Z00.00) Encounter for Medicare annual wellness exam  (primary encounter diagnosis)    (F03.90) Dementia, unspecified dementia severity, unspecified dementia type, unspecified whether behavioral, psychotic, or mood disturbance or anxiety (H)  Comment: Currently living at [facility] independent living [place] but memory has been slowly declining and she is now missing her medications more often and needing more assistance with things..  Plan: Family is quite hesitant to move her as this will be fairly traumatic for her with having to downsize and move buildings.  They will be instituting a medication alarm system to help with continuity of medication intake and are monitoring her health more closely.  We discussed if she starts showing signs of poor self-care, falls, or is not able to " "take her medications regularly would need to move her into assisted living.  We will continue to monitor her closely    (H61.23) Bilateral impacted cerumen  Comment:   Plan: Adult ENT  Referral        Ear irrigation was attempted today after patient consent.  Unfortunately, she was unable to tolerate this and the wax remains.  We discussed picking up over-the-counter wax drops and follow-up with ENT    (H91.93) Decreased hearing of both ears  Comment: This worsened after water irrigation, possibly dislodging some of the wax further and  Plan: Adult ENT  Referral        Follow-up with ENT recommended if persistent low hearing    (I10) Benign essential hypertension  Comment: Excellent control today but med compliance is a concern  Plan: Medication alarm system to be trialed.  Continue current medications    (R26.81) Gait instability  Comment: Uses walker  Plan: Reviewed importance of fall prevention.  She does have a call button that she wears and is being checked on daily    (E03.9) Acquired hypothyroidism  Comment: TSH levels have been normal at recent test  Plan: Monitor yearly    (Z23) High priority for 2019-nCoV vaccine  Comment:   Plan: COVID-19,PF,MODERNA BIVALENT 18+Yrs            (Z23) Need for prophylactic vaccination and inoculation against influenza  Comment:   Plan: INFLUENZA, QUAD, HIGH DOSE, PF, 65YR + (FLUZONE        HD)              COUNSELING:  Reviewed preventive health counseling, as reflected in patient instructions       Regular exercise       Healthy diet/nutrition       Hearing screening       Bladder control    Estimated body mass index is 27.79 kg/m  as calculated from the following:    Height as of this encounter: 1.479 m (4' 10.23\").    Weight as of this encounter: 60.8 kg (134 lb).        She reports that she has never smoked. She has never used smokeless tobacco.      Appropriate preventive services were discussed with this patient, including applicable screening as " appropriate for cardiovascular disease, diabetes, osteopenia/osteoporosis, and glaucoma.  As appropriate for age/gender, discussed screening for colorectal cancer, prostate cancer, breast cancer, and cervical cancer. Checklist reviewing preventive services available has been given to the patient.    Reviewed patients plan of care and provided an AVS. The Basic Care Plan (routine screening as documented in Health Maintenance) for Ania meets the Care Plan requirement. This Care Plan has been established and reviewed with the Patient.    Counseling Resources:  ATP IV Guidelines  Pooled Cohorts Equation Calculator  Breast Cancer Risk Calculator  Breast Cancer: Medication to Reduce Risk  FRAX Risk Assessment  ICSI Preventive Guidelines  Dietary Guidelines for Americans, 2010  [employer]'s MyPlate  ASA Prophylaxis  Lung CA Screening    Juliette Ordaz MD  [facility]    Identified Health Risks:    The patient was provided with suggestions to help her develop a healthy physical lifestyle.  She is at risk for lack of exercise and has been provided with information to increase physical activity for the benefit of her well-being.  The patient was counseled and encouraged to consider modifying their diet and eating habits. She was provided with information on recommended healthy diet options.  The patient reports that she has difficulty with activities of daily living. seeThe patient was provided with suggestions to help her develop a healthy emotional lifestyle.

## 2022-11-09 NOTE — TELEPHONE ENCOUNTER
FUTURE VISIT INFORMATION      FUTURE VISIT INFORMATION:    Date: 12/20/22    Time: 1:50pm    Location: [facility]  REFERRAL INFORMATION:    Referring provider:      Referring providers clinic:      Reason for visit/diagnosis  Ear wax removal, no hearing concerns, apt per Pt's Daughter In Law    RECORDS REQUESTED FROM:       Clinic name Comments Records Status Imaging Status                                         * no record

## 2023-01-01 ENCOUNTER — TELEPHONE (OUTPATIENT)
Dept: FAMILY MEDICINE | Facility: CLINIC | Age: 88
End: 2023-01-01
Payer: MEDICARE

## 2023-01-01 DIAGNOSIS — I10 BENIGN ESSENTIAL HYPERTENSION: ICD-10-CM

## 2023-01-01 RX ORDER — POTASSIUM CHLORIDE 1500 MG/1
TABLET, EXTENDED RELEASE ORAL
Qty: 90 TABLET | Refills: 1 | Status: SHIPPED | OUTPATIENT
Start: 2023-01-01

## 2023-02-14 NOTE — TELEPHONE ENCOUNTER
Anais from [facility] Medical Exam office, calling to report that they were notified of patient's death at [facility] ED. Anais calling to give update to PCP and for death certificate form to be filled once recieved.    Anais states that patient called 911 this morning due to having left shoulder blade pain. During EMS ride to ED, patient went into cardiac arrest. Noted she was in STEMI. EMS provided appropriate measures to revive patient, including CPR. Upon arrival to [facility] ED, patient was pronounced dead at 1011. Anais states that there was no trauma reported and that this was a natural death.     If PCP has any further questions while filling out death certificate, can give medical examiner office a call at [phone] and speak with any investigator.    Routing to provider to review and advise.    Virginia Pisano RN  [facility]

## 2023-02-15 ENCOUNTER — MYC MEDICAL ADVICE (OUTPATIENT)
Dept: FAMILY MEDICINE | Facility: CLINIC | Age: 88
End: 2023-02-15
Payer: MEDICARE